# Patient Record
Sex: FEMALE | Race: WHITE | NOT HISPANIC OR LATINO | Employment: FULL TIME | URBAN - METROPOLITAN AREA
[De-identification: names, ages, dates, MRNs, and addresses within clinical notes are randomized per-mention and may not be internally consistent; named-entity substitution may affect disease eponyms.]

---

## 2018-09-20 ENCOUNTER — OFFICE VISIT CONVERTED (OUTPATIENT)
Dept: INTERNAL MEDICINE | Facility: CLINIC | Age: 38
End: 2018-09-20
Attending: INTERNAL MEDICINE

## 2018-11-16 ENCOUNTER — OFFICE VISIT CONVERTED (OUTPATIENT)
Dept: INTERNAL MEDICINE | Facility: CLINIC | Age: 38
End: 2018-11-16
Attending: INTERNAL MEDICINE

## 2019-01-31 ENCOUNTER — HOSPITAL ENCOUNTER (OUTPATIENT)
Dept: GENERAL RADIOLOGY | Facility: HOSPITAL | Age: 39
Discharge: HOME | End: 2019-01-31
Attending: INTERNAL MEDICINE

## 2019-02-21 ENCOUNTER — OFFICE VISIT CONVERTED (OUTPATIENT)
Dept: INTERNAL MEDICINE | Facility: CLINIC | Age: 39
End: 2019-02-21
Attending: INTERNAL MEDICINE

## 2019-02-21 ENCOUNTER — CONVERSION ENCOUNTER (OUTPATIENT)
Dept: INTERNAL MEDICINE | Facility: CLINIC | Age: 39
End: 2019-02-21

## 2020-10-15 ENCOUNTER — HOSPITAL ENCOUNTER (OUTPATIENT)
Dept: GENERAL RADIOLOGY | Facility: HOSPITAL | Age: 40
Discharge: HOME | End: 2020-10-15
Attending: OBSTETRICS & GYNECOLOGY

## 2020-12-04 ENCOUNTER — HOSPITAL ENCOUNTER (OUTPATIENT)
Dept: OTHER | Facility: HOSPITAL | Age: 40
Discharge: HOME | End: 2020-12-04
Attending: INTERNAL MEDICINE

## 2020-12-04 ENCOUNTER — OFFICE VISIT CONVERTED (OUTPATIENT)
Dept: INTERNAL MEDICINE | Facility: CLINIC | Age: 40
End: 2020-12-04
Attending: INTERNAL MEDICINE

## 2020-12-04 LAB
BASOPHILS # BLD AUTO: 0.05 10*3/UL (ref 0–0.2)
BASOPHILS NFR BLD AUTO: 0.5 % (ref 0–3)
CONV ABS IMM GRAN: 0.02 10*3/UL (ref 0–0.2)
CONV IMMATURE GRAN: 0.2 % (ref 0–1.8)
DEPRECATED RDW RBC AUTO: 39.2 FL (ref 36.4–46.3)
EOSINOPHIL # BLD AUTO: 0.23 10*3/UL (ref 0–0.7)
EOSINOPHIL # BLD AUTO: 2.4 % (ref 0–7)
ERYTHROCYTE [DISTWIDTH] IN BLOOD BY AUTOMATED COUNT: 12.1 % (ref 11.7–14.4)
HCT VFR BLD AUTO: 40.9 % (ref 37–47)
HGB BLD-MCNC: 13.4 G/DL (ref 12–16)
LYMPHOCYTES # BLD AUTO: 3.45 10*3/UL (ref 1–5)
LYMPHOCYTES NFR BLD AUTO: 36.2 % (ref 20–45)
MCH RBC QN AUTO: 28.8 PG (ref 27–31)
MCHC RBC AUTO-ENTMCNC: 32.8 G/DL (ref 33–37)
MCV RBC AUTO: 88 FL (ref 81–99)
MONOCYTES # BLD AUTO: 0.57 10*3/UL (ref 0.2–1.2)
MONOCYTES NFR BLD AUTO: 6 % (ref 3–10)
NEUTROPHILS # BLD AUTO: 5.21 10*3/UL (ref 2–8)
NEUTROPHILS NFR BLD AUTO: 54.7 % (ref 30–85)
NRBC CBCN: 0 % (ref 0–0.7)
PLATELET # BLD AUTO: 342 10*3/UL (ref 130–400)
PMV BLD AUTO: 9.4 FL (ref 9.4–12.3)
RBC # BLD AUTO: 4.65 10*6/UL (ref 4.2–5.4)
WBC # BLD AUTO: 9.53 10*3/UL (ref 4.8–10.8)

## 2020-12-05 LAB
ALBUMIN SERPL-MCNC: 4.4 G/DL (ref 3.5–5)
ALBUMIN/GLOB SERPL: 1.6 {RATIO} (ref 1.4–2.6)
ALP SERPL-CCNC: 55 U/L (ref 42–98)
ALT SERPL-CCNC: 16 U/L (ref 10–40)
ANION GAP SERPL CALC-SCNC: 17 MMOL/L (ref 8–19)
AST SERPL-CCNC: 22 U/L (ref 15–50)
BILIRUB SERPL-MCNC: 0.22 MG/DL (ref 0.2–1.3)
BUN SERPL-MCNC: 14 MG/DL (ref 5–25)
BUN/CREAT SERPL: 23 {RATIO} (ref 6–20)
CALCIUM SERPL-MCNC: 9.4 MG/DL (ref 8.7–10.4)
CHLORIDE SERPL-SCNC: 104 MMOL/L (ref 99–111)
CHOLEST SERPL-MCNC: 149 MG/DL (ref 107–200)
CHOLEST/HDLC SERPL: 1.7 {RATIO} (ref 3–6)
CONV CO2: 23 MMOL/L (ref 22–32)
CONV TOTAL PROTEIN: 7.1 G/DL (ref 6.3–8.2)
CREAT UR-MCNC: 0.6 MG/DL (ref 0.5–0.9)
GFR SERPLBLD BASED ON 1.73 SQ M-ARVRAT: >60 ML/MIN/{1.73_M2}
GLOBULIN UR ELPH-MCNC: 2.7 G/DL (ref 2–3.5)
GLUCOSE SERPL-MCNC: 98 MG/DL (ref 65–99)
HDLC SERPL-MCNC: 90 MG/DL (ref 40–60)
LDLC SERPL CALC-MCNC: 47 MG/DL (ref 70–100)
OSMOLALITY SERPL CALC.SUM OF ELEC: 290 MOSM/KG (ref 273–304)
POTASSIUM SERPL-SCNC: 4 MMOL/L (ref 3.5–5.3)
SODIUM SERPL-SCNC: 140 MMOL/L (ref 135–147)
TRIGL SERPL-MCNC: 58 MG/DL (ref 40–150)
TSH SERPL-ACNC: 3.37 M[IU]/L (ref 0.27–4.2)
VLDLC SERPL-MCNC: 12 MG/DL (ref 5–37)

## 2020-12-06 LAB — HBV SURFACE AB SER QL: REACTIVE

## 2020-12-24 ENCOUNTER — HOSPITAL ENCOUNTER (OUTPATIENT)
Dept: URGENT CARE | Facility: CLINIC | Age: 40
Discharge: HOME | End: 2020-12-24

## 2020-12-25 LAB — SARS-COV-2 RNA SPEC QL NAA+PROBE: NOT DETECTED

## 2021-05-13 NOTE — PROGRESS NOTES
"   Progress Note      Patient Name: Samantha Ferro   Patient ID: [id number]   Sex: Female   YOB: 1980    Primary Care Provider: Alison Manuel MD    Visit Date: December 4, 2020    Provider: Alison Manuel MD   Location: [facility]   Location Address: [address] [place] [address]  [id number]   Location Phone: [phone]          Chief Complaint  · Annual physical  · \"I am here for my routine follow up\"  · \"I need to have bloodwork done\"      History Of Present Illness  Samantha Ferro is a 40 year old /White female who presents for evaluation and treatment of:      chronic issues.    shoulder pain bilateral  waking up with some numbness and tingling    last mammo was normal    Dr. Castillo did last pap and it was normal    no family hx of ovarian cancer  no family hx of colon cancer         Past Medical History  Disease Name Date Onset Notes   Allergic rhinitis --  --    Arthritis --  --    Blood Diseases --  --    Broken Bones --  --    Finger numbness 03/15/2015 --    Obesity 03/15/2015 Continue exercise, healthy diet, check thyroid function panel   Sinus trouble --  --    Skin Disease --  --          Past Surgical History  Procedure Name Date Notes   Adenoidectomy 1989 --    Cesarian Section 2013 --    Femur Fracture 1984 --    Tonsilectomy 1989 --          Medication List  Name Date Started Instructions   cetirizine 10 mg oral tablet 02/21/2019 TAKE 1 TABLET BY MOUTH ONCE DAILY   fluticasone 50 mcg/actuation nasal spray,suspension 02/21/2019 spray 1 spray (50 mcg) in each nostril by intranasal route once daily   montelukast 10 mg oral tablet 10/26/2020 TAKE 1 TABLET BY MOUTH EVERY DAY IN THE EVENING   Xanax 0.5 mg oral tablet 11/06/2019 take 1 tablet (0.5 mg) by oral route 30 minutes prior to flight         Allergy List  Allergen Name Date Reaction Notes   Dander (animal) allergy --  --  --    Molds --  --  --    PENICILLINS --  Hives/Rash " "--    Ragweed --  --  --          Family Medical History  Disease Name Relative/Age Notes   Stroke  grandparents   Heart Disease Father/  Mother/   grandparents   Congestive Heart Failure Father/  Mother/   --    Lung cancer Father/  Uncle/   --    Prostate cancer Uncle/   --          Reproductive History  Menstrual   Last Menstrual Period: 2015   Pregnancy Summary   Total Pregnancies: 1 Full Term: 1 Premature: 0   Ab Induced: 0 Ab Spontaneous: 0 Ectopics: 0   Multiples: 0 Livin         Social History  Finding Status Start/Stop Quantity Notes   Alcohol Never --/-- --  --    Tobacco Never --/-- --  --          Immunizations  NameDate Admin Mfg Trade Name Lot Number Route Inj VIS Given VIS Publication   Wmfdkczty29/16/2018 SKB Fluzone Quadrivalent [id number] IM LD 2018   Comments: pt tolerated well, left office in stable condition         Review of Systems  · Constitutional  o Denies  o : fever, fatigue, weight loss, weight gain  · Cardiovascular  o Denies  o : lower extremity edema, claudication, chest pressure, palpitations  · Respiratory  o Denies  o : shortness of breath, wheezing, cough, hemoptysis, dyspnea on exertion  · Gastrointestinal  o Denies  o : nausea, vomiting, diarrhea, constipation, abdominal pain      Vitals  Date Time BP Position Site L\R Cuff Size HR RR TEMP (F) WT  HT  BMI kg/m2 BSA m2 O2 Sat FR L/min FiO2 HC       2018 03:33 /74 Sitting    73 - R 16 98.7 171lbs 2oz 5'  4\" 29.37 1.87 100 %      2019 09:04 /64 Sitting    78 - R 16 97.9 175lbs 8oz 5'  4\" 30.12 1.9 100 %      2020 03:27 /80 Sitting    73 - R 16 97.7 174lbs 8oz 5'  4\" 29.95 1.89 100 %  21%          Physical Examination  · Constitutional  o Appearance  o : no acute distress, well-nourished  · Head and Face  o Head  o :   § Inspection  § : atraumatic, normocephalic  · Eyes  o Eyes  o : extraocular movements intact, no scleral icterus, no conjunctival injection  · Ears, Nose, " Mouth and Throat  o Ears  o :   § External Ears  § : normal  o Nose  o :   § Intranasal Exam  § : nares patent  o Oral Cavity  o :   § Oral Mucosa  § : moist mucous membranes  · Respiratory  o Respiratory Effort  o : breathing comfortably, symmetric chest rise  o Auscultation of Lungs  o : clear to asculatation bilaterally, no wheezes, rales, or rhonchii  · Cardiovascular  o Heart  o :   § Auscultation of Heart  § : regular rate and rhythm, no murmurs, rubs, or gallops  o Peripheral Vascular System  o :   § Extremities  § : no edema  · Neurologic  o Mental Status Examination  o :   § Orientation  § : grossly oriented to person, place and time  o Gait and Station  o :   § Gait Screening  § : normal gait  · Psychiatric  o General  o : normal mood and affect          Assessment  · Annual physical exam     V70.0/Z00.00  very healthy  utd on vaccines  · Screening for depression     V79.0/Z13.89  · Screening for lipid disorders     V77.91/Z13.220  · Shoulder pain     719.41/M25.519  · Increased urinary frequency     788.41/R35.0       will check labs  can do imaging if shoulder pain worsens     Problems Reconciled  Plan  · Orders  o ACO-18: Negative screen for clinical depression using a standardized tool () - V79.0/Z13.89 - 12/04/2020  o CBC with Auto Diff [facility] (97706) - V70.0/Z00.00, V79.0/Z13.89, 719.41/M25.519 - 12/04/2020  o CMP [facility] (09776) - V70.0/Z00.00, V79.0/Z13.89, 719.41/M25.519 - 12/04/2020  o Lipid Panel [facility] (86308) - V77.91/Z13.220 - 12/04/2020  o TSH [facility] (01301) - V70.0/Z00.00, V79.0/Z13.89, 719.41/M25.519, V77.91/Z13.220 - 12/04/2020  o ACO-39: Current medications updated and reviewed (, 1159F) - - 12/04/2020  o Hepatitis B antibody titer (59714, 11355, 79576) - V70.0/Z00.00 - 12/04/2020  · Medications  o Medications have been Reconciled  o Transition of Care or Provider Policy  · Instructions  o Reviewed health maintenance flowsheet and updated information. Orders were placed and/or patient's  response was documented.  o Depression Screen completed and scanned into the EMR under the designated folder within the patient's documents.  o Today's PHQ-9 result is _0__  o Patient was educated/instructed on their diagnosis, treatment and medications prior to discharge from the clinic today.            Electronically Signed by: Alison Manuel MD -Author on December 27, 2020 03:05:48 PM

## 2021-05-14 VITALS
WEIGHT: 174.5 LBS | OXYGEN SATURATION: 100 % | DIASTOLIC BLOOD PRESSURE: 80 MMHG | HEIGHT: 64 IN | HEART RATE: 73 BPM | SYSTOLIC BLOOD PRESSURE: 116 MMHG | TEMPERATURE: 97.7 F | RESPIRATION RATE: 16 BRPM | BODY MASS INDEX: 29.79 KG/M2

## 2021-05-15 VITALS
HEIGHT: 64 IN | RESPIRATION RATE: 16 BRPM | WEIGHT: 175.5 LBS | OXYGEN SATURATION: 100 % | DIASTOLIC BLOOD PRESSURE: 64 MMHG | TEMPERATURE: 97.9 F | HEART RATE: 78 BPM | SYSTOLIC BLOOD PRESSURE: 108 MMHG | BODY MASS INDEX: 29.96 KG/M2

## 2021-05-16 VITALS
BODY MASS INDEX: 29.21 KG/M2 | RESPIRATION RATE: 16 BRPM | DIASTOLIC BLOOD PRESSURE: 74 MMHG | TEMPERATURE: 98.7 F | HEIGHT: 64 IN | OXYGEN SATURATION: 100 % | SYSTOLIC BLOOD PRESSURE: 118 MMHG | WEIGHT: 171.12 LBS | HEART RATE: 73 BPM

## 2021-05-16 VITALS
BODY MASS INDEX: 28.25 KG/M2 | DIASTOLIC BLOOD PRESSURE: 52 MMHG | OXYGEN SATURATION: 100 % | RESPIRATION RATE: 14 BRPM | HEIGHT: 64 IN | HEART RATE: 67 BPM | WEIGHT: 165.5 LBS | SYSTOLIC BLOOD PRESSURE: 102 MMHG | TEMPERATURE: 98.2 F

## 2021-07-22 RX ORDER — MONTELUKAST SODIUM 10 MG/1
10 TABLET ORAL EVERY EVENING
Qty: 90 TABLET | Refills: 1 | Status: SHIPPED | OUTPATIENT
Start: 2021-07-22 | End: 2021-11-29 | Stop reason: SDUPTHER

## 2021-11-29 RX ORDER — MONTELUKAST SODIUM 10 MG/1
10 TABLET ORAL EVERY EVENING
Qty: 90 TABLET | Refills: 1 | Status: SHIPPED | OUTPATIENT
Start: 2021-11-29 | End: 2022-05-19 | Stop reason: SDUPTHER

## 2021-11-29 NOTE — TELEPHONE ENCOUNTER
Caller: Samantha Ferro    Relationship: Self    Best call back number: [phone]    Requested Prescriptions:   Requested Prescriptions     Pending Prescriptions Disp Refills   • montelukast (SINGULAIR) 10 MG tablet 90 tablet 1     Sig: Take 1 tablet by mouth Every Evening.        Pharmacy where request should be sent: [id number] - [place], [place] - [address] AT [facility] [phone] [facility] [phone]      Additional details provided by patient:     Does the patient have less than a 3 day supply:  [x] Yes  [] No    Prabhakar Tsai Rep   11/29/21 13:44 EST

## 2022-05-19 ENCOUNTER — OFFICE VISIT (OUTPATIENT)
Dept: INTERNAL MEDICINE | Facility: CLINIC | Age: 42
End: 2022-05-19

## 2022-05-19 VITALS
HEART RATE: 68 BPM | DIASTOLIC BLOOD PRESSURE: 72 MMHG | BODY MASS INDEX: 31.41 KG/M2 | HEIGHT: 64 IN | WEIGHT: 184 LBS | TEMPERATURE: 97.4 F | RESPIRATION RATE: 18 BRPM | OXYGEN SATURATION: 100 % | SYSTOLIC BLOOD PRESSURE: 124 MMHG

## 2022-05-19 DIAGNOSIS — M79.621 PAIN IN RIGHT UPPER ARM: Primary | ICD-10-CM

## 2022-05-19 PROCEDURE — 99213 OFFICE O/P EST LOW 20 MIN: CPT | Performed by: NURSE PRACTITIONER

## 2022-05-19 RX ORDER — FLUTICASONE PROPIONATE 50 MCG
1 SPRAY, SUSPENSION (ML) NASAL ONCE AS NEEDED
COMMUNITY

## 2022-05-19 RX ORDER — CETIRIZINE HYDROCHLORIDE 5 MG/1
5 TABLET ORAL DAILY
Qty: 90 TABLET | Refills: 1 | Status: CANCELLED | OUTPATIENT
Start: 2022-05-19

## 2022-05-19 RX ORDER — IBUPROFEN 600 MG/1
600 TABLET ORAL EVERY 8 HOURS PRN
Qty: 90 TABLET | Refills: 1 | Status: SHIPPED | OUTPATIENT
Start: 2022-05-19 | End: 2022-10-21

## 2022-05-19 RX ORDER — MONTELUKAST SODIUM 10 MG/1
10 TABLET ORAL EVERY EVENING
Qty: 90 TABLET | Refills: 1 | Status: SHIPPED | OUTPATIENT
Start: 2022-05-19 | End: 2022-11-14

## 2022-05-19 RX ORDER — CETIRIZINE HYDROCHLORIDE 5 MG/1
5 TABLET ORAL DAILY
COMMUNITY

## 2022-05-19 NOTE — PROGRESS NOTES
"Chief Complaint  Arm Pain (Right Arm- 3 Months)    Subjective          Samantha Ferro presents to [facility]  History of Present Illness  Right arm pain at night in the last 3 mth  She has had issues with numbness and tingling ffor years  She has started exercising prior to onset of pain but denies known injury  She reports pain with lifting arm and with internal rotation  Denies related or recent paresthesias of the hand    Objective   Vital Signs:   /72 (BP Location: Left arm, Patient Position: Sitting, Cuff Size: Adult)   Pulse 68   Temp 97.4 °F (36.3 °C)   Resp 18   Ht 162.6 cm (64\")   Wt 83.5 kg (184 lb)   SpO2 100%   BMI 31.58 kg/m²     Physical Exam  Vitals and nursing note reviewed.   Constitutional:       General: She is not in acute distress.     Appearance: Normal appearance.   HENT:      Head: Normocephalic and atraumatic.      Right Ear: External ear normal.      Left Ear: External ear normal.      Nose: Nose normal.      Mouth/Throat:      Mouth: Mucous membranes are moist.   Eyes:      Conjunctiva/sclera: Conjunctivae normal.   Cardiovascular:      Rate and Rhythm: Normal rate and regular rhythm.      Pulses: Normal pulses.      Heart sounds: Normal heart sounds. No murmur heard.    No friction rub. No gallop.   Pulmonary:      Effort: Pulmonary effort is normal. No respiratory distress.      Breath sounds: No wheezing, rhonchi or rales.   Musculoskeletal:      Right upper arm: Normal. No swelling, edema, deformity, lacerations, tenderness or bony tenderness.      Cervical back: Neck supple.   Skin:     General: Skin is warm and dry.   Neurological:      General: No focal deficit present.      Mental Status: She is alert and oriented to person, place, and time.   Psychiatric:         Mood and Affect: Mood normal.         Behavior: Behavior normal.        Result Review :          Procedures      Assessment and Plan    Diagnoses and " all orders for this visit:    1. Pain in right upper arm (Primary)  Comments:  discussed likely strain/sprain injury. will try nsaids x2 wks and start rehab exercises at home. discussed outpt PT if not improving in 4-6 wks    Other orders  -     montelukast (SINGULAIR) 10 MG tablet; Take 1 tablet by mouth Every Evening.  Dispense: 90 tablet; Refill: 1  -     ibuprofen (ADVIL,MOTRIN) 600 MG tablet; Take 1 tablet by mouth Every 8 (Eight) Hours As Needed for Mild Pain .  Dispense: 90 tablet; Refill: 1              Follow Up   Return if symptoms worsen or fail to improve.  Patient was given instructions and counseling regarding her condition or for health maintenance advice. Please see specific information pulled into the AVS if appropriate.

## 2022-09-13 ENCOUNTER — TELEPHONE (OUTPATIENT)
Dept: INTERNAL MEDICINE | Facility: CLINIC | Age: 42
End: 2022-09-13

## 2022-09-13 DIAGNOSIS — M79.601 PAIN OF RIGHT UPPER EXTREMITY: Primary | ICD-10-CM

## 2022-09-13 NOTE — TELEPHONE ENCOUNTER
Caller: Samantha Ferro    Relationship: Self    Best call back number: [phone]    Who are you requesting to speak with (clinical staff, provider,  specific staff member): MEDICAL STAFF    What was the call regarding: PATIENT WAS SEEN IN MAY FOR ARM PAIN. SHE WAS TOLD TO TAKE IBUPROFEN FOR 4 WEEKS. SHE TOOK THE MEDICATION AND HER ARM IS STILL BOTHERING HER. SHE FEELS LIKE HER ARM IS GETTING WORSE AND THAT THERE IS SOMETHING WRONG. SHE WOULD LIKE TO KNOW WHAT THE NEXT STEP IS. PLEASE CALL PATIENT TO ADVISE.

## 2022-09-19 NOTE — TELEPHONE ENCOUNTER
Patient saw Keyla on 5/19/22 for her arm pain. Per Keyla`s note: discussed likely strain/sprain injury. will try nsaids x2 wks and start rehab exercises at home. discussed outpt PT if not improving in 4-6 wks.     Patient stated that she is still having the arm pain requesting what to do next.

## 2022-09-21 NOTE — TELEPHONE ENCOUNTER
It looks like she has not had an x-ray.  If not I would recommend starting there if she has and everything was normal then I think physical therapy would be the next step.

## 2022-09-22 NOTE — TELEPHONE ENCOUNTER
Red rule verified and correct.    Pt ok with x-ray; advised she believes it is more soft tissue.  Pain localizes between her elbow and shoulder, right.    It has hurt since her covid vaccine injection.

## 2022-09-22 NOTE — TELEPHONE ENCOUNTER
Xray order placed, and she can get any time, but if pain significant or if she has fever etc she needs to be seen

## 2022-09-22 NOTE — TELEPHONE ENCOUNTER
Red rule verified and correct.    Relayed message from Dr Alison Manuel.     Patient verbalized understanding.

## 2022-09-22 NOTE — TELEPHONE ENCOUNTER
Caller: Samantha Ferro    Relationship: Self    Best call back number: [phone]    What is the best time to reach you: ANY    Who are you requesting to speak with (clinical staff, provider,  specific staff member): CLINICAL    What was the call regarding: NEXT STEP FOR ARM PAIN    Do you require a callback: PATIENT REQUESTS A CALL BACK ASAP TO DISCUSS.

## 2022-09-23 ENCOUNTER — HOSPITAL ENCOUNTER (OUTPATIENT)
Dept: GENERAL RADIOLOGY | Facility: HOSPITAL | Age: 42
Discharge: HOME | End: 2022-09-23
Admitting: INTERNAL MEDICINE

## 2022-09-23 DIAGNOSIS — M79.601 PAIN OF RIGHT UPPER EXTREMITY: ICD-10-CM

## 2022-09-23 PROCEDURE — 73060 X-RAY EXAM OF HUMERUS: CPT

## 2022-09-26 ENCOUNTER — TELEPHONE (OUTPATIENT)
Dept: INTERNAL MEDICINE | Facility: CLINIC | Age: 42
End: 2022-09-26

## 2022-10-21 ENCOUNTER — OFFICE VISIT (OUTPATIENT)
Dept: INTERNAL MEDICINE | Facility: CLINIC | Age: 42
End: 2022-10-21

## 2022-10-21 VITALS
TEMPERATURE: 98.3 F | BODY MASS INDEX: 32.27 KG/M2 | HEIGHT: 64 IN | SYSTOLIC BLOOD PRESSURE: 120 MMHG | HEART RATE: 87 BPM | RESPIRATION RATE: 18 BRPM | DIASTOLIC BLOOD PRESSURE: 78 MMHG | WEIGHT: 189 LBS | OXYGEN SATURATION: 97 %

## 2022-10-21 DIAGNOSIS — G89.29 CHRONIC RIGHT SHOULDER PAIN: ICD-10-CM

## 2022-10-21 DIAGNOSIS — M79.601 PAIN OF RIGHT UPPER EXTREMITY: ICD-10-CM

## 2022-10-21 DIAGNOSIS — Z00.00 ANNUAL PHYSICAL EXAM: Primary | ICD-10-CM

## 2022-10-21 DIAGNOSIS — M25.511 CHRONIC RIGHT SHOULDER PAIN: ICD-10-CM

## 2022-10-21 PROCEDURE — 99396 PREV VISIT EST AGE 40-64: CPT | Performed by: INTERNAL MEDICINE

## 2022-10-21 NOTE — PROGRESS NOTES
"Chief Complaint  Follow-up, Arm Pain (Right Arm- Since April), and Annual Exam    Subjective          Samantha Ferro presents to [facility]  History of Present Illness     The patient is a 42-year-old female who presents today for evaluation of right upper extremity pain.    Right upper extremity pain  The patient reports pain localized to the lateral aspect of right upper extremity. She states her pain began in approximately 01/2022 or 02/2022. At that time, she was exercising and believed she may have lifted a dumbbell \"weird\" as she was not performing any strenuous activity. In 04/2022, she saw Keyla where x-rays of the right upper extremity were taken and ibuprofen was prescribed. The patient states the ibuprofen reduced her pain, but did not fully resolve it. 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 Currently, she reports having difficulty lifting her upper extremity fully to insert a wire at work as well as pain when pushing the wire. She primarily has pain when reaching behind or across her body. The patient denies any tenderness to palpation. She does report tightness. She notes a history of paresthesias in her hands. When lying on her right upper extremity at night, she notices pain and she has to physically lift her upper extremity to roll over. The patient is scheduled to see her chiropractor on 10/27/2022. She is concerned about nerve damage secondary to her COVID-19 vaccination, although she obtained this a long time ago. She notes she is right-hand dominant and is usually stronger on her right side, although currently her l eft side is stronger. Her pain is increased when working. The patient denies any dyspnea, angina, or headaches. Although, she does note seeing her eye doctor today for a new prescription. However, she denies any blurry spots or loss of vision.     The patient states she " "recently obtained laboratory studies ordered by CLAUS Chavez from Doctor Lashonda's office in [facility]. He stated her blood work was all within normal limits.      The patient works part-time as an orthodontic assistant.     Foot pain  The patient states she saw her foot specialist today where she was prescribed anti-inflammatory medication and given a cortisone injection in her foot. She inquires if this will help her upper extremity pain.    Health maintenance  The patient states she recently called and scheduled her Pap smear for 11/2022. Her last Pap smear was approximately 2 years ago. Her blood pressure is excellent today.     Family history  The patient states her mother has fibromyalgia. She denies any family history of breast or ovarian cancer.     Objective   Vital Signs:   /78 (BP Location: Right arm, Patient Position: Sitting, Cuff Size: Adult)   Pulse 87   Temp 98.3 °F (36.8 °C)   Resp 18   Ht 162.6 cm (64\")   Wt 85.7 kg (189 lb)   SpO2 97%   BMI 32.44 kg/m²     Physical Exam  Vitals reviewed.   Constitutional:       Appearance: Normal appearance. She is well-developed.   HENT:      Head: Normocephalic and atraumatic.      Right Ear: External ear normal.      Left Ear: External ear normal.   Eyes:      Conjunctiva/sclera: Conjunctivae normal.      Pupils: Pupils are equal, round, and reactive to light.   Cardiovascular:      Rate and Rhythm: Normal rate and regular rhythm.      Heart sounds: No murmur heard.    No friction rub. No gallop.   Pulmonary:      Effort: Pulmonary effort is normal.      Breath sounds: Normal breath sounds. No wheezing or rhonchi.   Skin:     General: Skin is warm and dry.   Neurological:      Mental Status: She is alert and oriented to person, place, and time.   Psychiatric:         Mood and Affect: Affect normal.         Behavior: Behavior normal.         Thought Content: Thought content normal.        Result Review :           No results found for this or " any previous visit.         Procedures        Assessment and Plan    Diagnoses and all orders for this visit:    1. Annual physical exam (Primary)  Comments:  She is scheduled for her Pap smear. Offered, but declines an influenza vaccination. Advised no exercise at this time. Continue healthy diet.    2. Pain of right upper extremity  -     MRI Shoulder Right Without Contrast; Future    3. Chronic right shoulder pain  Comments:  MRI scan ordered. Treatment will be determined based on the results. Recent laboratory study records requested.  Orders:  -     MRI Shoulder Right Without Contrast; Future        {BMI is >= 30 and <35. (Class 1 Obesity). The following options were offered after discussion;: nutrition counseling/recommendations            Follow Up   Return in about 2 months (around 12/21/2022).  Patient was given instructions and counseling regarding her condition or for health maintenance advice. Please see specific information pulled into the AVS if appropriate.     Transcribed from ambient dictation for Alison Manuel MD by Haleigh Trevino.  10/21/22   19:41 EDT    Patient or patient representative verbalized consent to the visit recording.  I have personally performed the services described in this document as transcribed by the above individual, and it is both accurate and complete.

## 2022-11-04 ENCOUNTER — TELEPHONE (OUTPATIENT)
Dept: INTERNAL MEDICINE | Facility: CLINIC | Age: 42
End: 2022-11-04

## 2022-11-04 DIAGNOSIS — M79.603 PAIN OF UPPER EXTREMITY, UNSPECIFIED LATERALITY: Primary | ICD-10-CM

## 2022-11-04 NOTE — TELEPHONE ENCOUNTER
Caller: Samantha Ferro    Relationship: Self    Best call back number: [phone]    What is the best time to reach you: ANYTIME    Who are you requesting to speak with (clinical staff, provider,  specific staff member): CLINICAL    What was the call regarding: PATIENT IS CALLING TO LET US KNOW THAT THE MRI THAT [facility] WANTED PATIENT TO DO IS GOING TO COST HER OVER $1,000. SHE MENTIONED THAT SHE DID SEE A MASSAGE THERAPIST REGARDING HER SHOULDER AND THEY MENTIONED THAT PHYSICAL THERAPY COULD HELP. PATIENT IS UNSURE IF PHYSICAL THERAPY WILL BE COVERED IF SHE DOESN'T HAVE A MRI FIRST, OR NOT. SHE IS REQUESTING CALL BACK IN REGARDS TO THIS AND FIND OUT WHAT THE NEXT STEP IS.     Do you require a callback: YES

## 2022-11-14 RX ORDER — MONTELUKAST SODIUM 10 MG/1
10 TABLET ORAL EVERY EVENING
Qty: 90 TABLET | Refills: 1 | Status: SHIPPED | OUTPATIENT
Start: 2022-11-14

## 2022-11-16 NOTE — PROGRESS NOTES
Well Woman Visit    CC: WWE     Myriad intake: No    Tobacco/Nicotine use:  No    HPI:   42 y.o. Contraception or HRT: Contraception:  Tubal ligation  NIKITA-3 2013 with LEEP    History: PMHx, Meds, Allergies, PSHx, Social Hx, and POBHx all reviewed and updated.  PCP: does manage PMHx and preventative labs      /76   Wt 84.8 kg (187 lb)   LMP 10/27/2022 (Approximate)   BMI 32.10 kg/m²     Physical Exam Physical Exam  Vitals and nursing note reviewed. Exam conducted with a chaperone present.   Constitutional:       Appearance: Normal appearance.   Neck:      Thyroid: No thyroid mass or thyromegaly.   Cardiovascular:      Rate and Rhythm: Regular rhythm.   Pulmonary:      Effort: Pulmonary effort is normal.      Unlabored  Chest:      Breasts: Normal glandularity        right: No mass, nipple discharge or tenderness.         Left: No mass, nipple discharge or tenderness.   Abdominal:      General: There is no distension.      Palpations: Abdomen is soft.      Tenderness: There is no guarding or rebound.   Genitourinary:     General: Normal vulva.      Labia:   No lesions     Urethra: No urethral pain or urethral lesion.      Vagina: Normal.      Cervix: Normal.      Uterus: Normal.  Mid     Adnexa: Right adnexa normal and left adnexa normal.   Musculoskeletal:      Cervical back: Neck supple. No tenderness.   Skin:     General: Skin is warm and dry.   Neurological:      Mental Status: She is alert and oriented to person, place, and time.   Psychiatric:         Mood and Affect: Mood normal.         Behavior: Behavior normal.         Thought Content: Thought content normal.       ASSESSMENT AND PLAN:  WWE    Diagnoses and all orders for this visit:    1. Well woman exam with routine gynecological exam (Primary)  -     IgP, Aptima HPV  -     Mammo Screening Digital Tomosynthesis Bilateral With CAD          Counseling:     Mammogram recommended yearly    Preventative:   MMG ordered mammogram  ordered      Follow Up:  No follow-ups on file.    David Castillo Sr., MD  11/17/2022

## 2022-11-17 ENCOUNTER — OFFICE VISIT (OUTPATIENT)
Dept: OBSTETRICS AND GYNECOLOGY | Facility: CLINIC | Age: 42
End: 2022-11-17

## 2022-11-17 VITALS — BODY MASS INDEX: 32.1 KG/M2 | SYSTOLIC BLOOD PRESSURE: 122 MMHG | DIASTOLIC BLOOD PRESSURE: 76 MMHG | WEIGHT: 187 LBS

## 2022-11-17 DIAGNOSIS — Z01.419 WELL WOMAN EXAM WITH ROUTINE GYNECOLOGICAL EXAM: Primary | ICD-10-CM

## 2022-11-17 PROCEDURE — 99396 PREV VISIT EST AGE 40-64: CPT | Performed by: OBSTETRICS & GYNECOLOGY

## 2022-11-17 PROCEDURE — 87624 HPV HI-RISK TYP POOLED RSLT: CPT | Performed by: OBSTETRICS & GYNECOLOGY

## 2022-11-17 PROCEDURE — G0123 SCREEN CERV/VAG THIN LAYER: HCPCS | Performed by: OBSTETRICS & GYNECOLOGY

## 2022-11-17 RX ORDER — MELOXICAM 15 MG/1
15 TABLET ORAL DAILY
COMMUNITY
Start: 2022-10-21

## 2022-11-17 RX ORDER — PHENTERMINE HYDROCHLORIDE 37.5 MG/1
TABLET ORAL
COMMUNITY
Start: 2022-11-04

## 2022-11-17 RX ORDER — AZITHROMYCIN 250 MG/1
TABLET, FILM COATED ORAL
Qty: 6 TABLET | Refills: 1 | Status: SHIPPED | OUTPATIENT
Start: 2022-11-17 | End: 2022-11-22

## 2022-11-18 ENCOUNTER — APPOINTMENT (OUTPATIENT)
Dept: MRI IMAGING | Facility: HOSPITAL | Age: 42
End: 2022-11-18

## 2022-11-23 NOTE — TELEPHONE ENCOUNTER
Very reasonable to get physical therapy first.  I do not know what will cost every insurance is different especially if she has a high deductible plan is likely to cost quite a bit.  Place order if she would like to go diagnosis arm pain.

## 2022-11-28 LAB
CYTOLOGIST CVX/VAG CYTO: NORMAL
CYTOLOGY CVX/VAG DOC CYTO: NORMAL
CYTOLOGY CVX/VAG DOC THIN PREP: NORMAL
DX ICD CODE: NORMAL
HIV 1 & 2 AB SER-IMP: NORMAL
HPV I/H RISK 4 DNA CVX QL PROBE+SIG AMP: NEGATIVE
OTHER STN SPEC: NORMAL
STAT OF ADQ CVX/VAG CYTO-IMP: NORMAL

## 2022-12-19 ENCOUNTER — TELEPHONE (OUTPATIENT)
Dept: PHYSICAL THERAPY | Facility: CLINIC | Age: 42
End: 2022-12-19

## 2023-02-03 ENCOUNTER — TREATMENT (OUTPATIENT)
Dept: PHYSICAL THERAPY | Facility: CLINIC | Age: 43
End: 2023-02-03
Payer: COMMERCIAL

## 2023-02-03 DIAGNOSIS — M75.41 IMPINGEMENT SYNDROME OF RIGHT SHOULDER: ICD-10-CM

## 2023-02-03 DIAGNOSIS — M25.511 CHRONIC RIGHT SHOULDER PAIN: Primary | ICD-10-CM

## 2023-02-03 DIAGNOSIS — G89.29 CHRONIC RIGHT SHOULDER PAIN: Primary | ICD-10-CM

## 2023-02-03 DIAGNOSIS — R29.898 WEAKNESS OF RIGHT UPPER EXTREMITY: ICD-10-CM

## 2023-02-03 PROCEDURE — 97161 PT EVAL LOW COMPLEX 20 MIN: CPT | Performed by: PHYSICAL THERAPIST

## 2023-02-03 PROCEDURE — 97110 THERAPEUTIC EXERCISES: CPT | Performed by: PHYSICAL THERAPIST

## 2023-02-10 ENCOUNTER — TREATMENT (OUTPATIENT)
Dept: PHYSICAL THERAPY | Facility: CLINIC | Age: 43
End: 2023-02-10
Payer: COMMERCIAL

## 2023-02-10 DIAGNOSIS — M25.511 CHRONIC RIGHT SHOULDER PAIN: Primary | ICD-10-CM

## 2023-02-10 DIAGNOSIS — M75.41 IMPINGEMENT SYNDROME OF RIGHT SHOULDER: ICD-10-CM

## 2023-02-10 DIAGNOSIS — R29.898 WEAKNESS OF RIGHT UPPER EXTREMITY: ICD-10-CM

## 2023-02-10 DIAGNOSIS — G89.29 CHRONIC RIGHT SHOULDER PAIN: Primary | ICD-10-CM

## 2023-02-10 PROCEDURE — 97140 MANUAL THERAPY 1/> REGIONS: CPT | Performed by: PHYSICAL THERAPIST

## 2023-02-10 PROCEDURE — 97112 NEUROMUSCULAR REEDUCATION: CPT | Performed by: PHYSICAL THERAPIST

## 2023-02-10 PROCEDURE — 97110 THERAPEUTIC EXERCISES: CPT | Performed by: PHYSICAL THERAPIST

## 2023-02-10 NOTE — PROGRESS NOTES
Outpatient Physical Therapy  [address] [facility] [place] [place] [address]                            Physical Therapy Daily Treatment Note    Patient: Samantha Ferro   : 1980  Diagnosis/ICD-10 Code:  Chronic right shoulder pain [M25.511, G89.29]  Referring practitioner: Alison Manuel MD  Date of Initial Visit: Type: THERAPY  Noted: 2/3/2023  Today's Date: 2/10/2023  Patient seen for 2 sessions             Subjective   Samantha Ferro reports: that shoulder is doing a little better, she has been doing her exercises and feels like she ends up with a burning pain in her neck/top of shoulder with exercises.     Objective   Decreased Range of Motion  And increased discomfort with PROM shoulder abduction.    See Exercise, Manual, and Modality Logs for complete treatment.     Assessment/Plan  Samantha progressing as evident by decreased overall shoulder pain. Pt tolerated exercises and manual well, decreased ROM and minimal discomfort with shoulder abduction. Pt would benefit from skilled PT to address Range of Motion  and Strength deficits, pain management and any concerns with ADLs.         Progress per Plan of Care         Timed:  Manual Therapy:    14     mins  03709;  Therapeutic Exercise:    8     mins  61335;     Neuromuscular Darron:   8     mins  59591;    Therapeutic Activity:          mins  98465;     Gait Training:           mins  85736;    Aquatic Therapy:          mins  16381;       Untimed:  Electrical Stimulation:         mins  94667 ( );  Mechanical Traction:         mins  67452;       Timed Treatment:   30   mins   Total Treatment:     30   mins      Electronically signed:   Bess Benavidez PTA  Physical Therapist Assistant  [facility] License #: [id number]

## 2023-04-06 ENCOUNTER — HOSPITAL ENCOUNTER (OUTPATIENT)
Dept: MAMMOGRAPHY | Facility: HOSPITAL | Age: 43
Discharge: HOME | End: 2023-04-06
Admitting: OBSTETRICS & GYNECOLOGY
Payer: COMMERCIAL

## 2023-04-06 PROCEDURE — 77067 SCR MAMMO BI INCL CAD: CPT

## 2023-04-06 PROCEDURE — 77063 BREAST TOMOSYNTHESIS BI: CPT

## 2023-04-17 RX ORDER — MONTELUKAST SODIUM 10 MG/1
10 TABLET ORAL EVERY EVENING
Qty: 90 TABLET | Refills: 1 | Status: SHIPPED | OUTPATIENT
Start: 2023-04-17

## 2023-07-20 ENCOUNTER — TELEPHONE (OUTPATIENT)
Dept: INTERNAL MEDICINE | Facility: CLINIC | Age: 43
End: 2023-07-20
Payer: COMMERCIAL

## 2023-07-20 DIAGNOSIS — Z13.220 SCREENING, LIPID: ICD-10-CM

## 2023-07-20 DIAGNOSIS — R53.83 OTHER FATIGUE: Primary | ICD-10-CM

## 2023-07-20 DIAGNOSIS — L65.9 HAIR LOSS: ICD-10-CM

## 2023-07-20 NOTE — TELEPHONE ENCOUNTER
"    Caller: Samantha Ferro \"Sadie\"    Relationship: Self    Best call back number: [phone]    What orders are you requesting (i.e. lab or imaging): BLOOD WORK ORDERS, CHECK THYROID    In what timeframe would the patient need to come in: AS SOON AS POSSIBLE        Additional notes: PATIENT STATED HER HAIR ISNT GROWING AND IT'S THINNING. SHE WOULD LIKE TO HAVE HER THYROID CHECKED. PATIENT WOULD ALSO LIKE TO DISCUSS NAUSEA MEDICATION BECAUSE SHE IS GOING TO GO ON A CRUISE AT THE END OF AUGUST.     "

## 2023-07-27 ENCOUNTER — LAB (OUTPATIENT)
Dept: LAB | Facility: HOSPITAL | Age: 43
End: 2023-07-27
Payer: COMMERCIAL

## 2023-07-27 LAB
ALBUMIN SERPL-MCNC: 4.1 G/DL (ref 3.5–5.2)
ALBUMIN/GLOB SERPL: 1.6 G/DL
ALP SERPL-CCNC: 61 U/L (ref 39–117)
ALT SERPL W P-5'-P-CCNC: 8 U/L (ref 1–33)
ANION GAP SERPL CALCULATED.3IONS-SCNC: 11.5 MMOL/L (ref 5–15)
AST SERPL-CCNC: 14 U/L (ref 1–32)
BASOPHILS # BLD AUTO: 0.06 10*3/MM3 (ref 0–0.2)
BASOPHILS NFR BLD AUTO: 0.6 % (ref 0–1.5)
BILIRUB SERPL-MCNC: 0.4 MG/DL (ref 0–1.2)
BUN SERPL-MCNC: 11 MG/DL (ref 6–20)
BUN/CREAT SERPL: 13.8 (ref 7–25)
CALCIUM SPEC-SCNC: 8.9 MG/DL (ref 8.6–10.5)
CHLORIDE SERPL-SCNC: 106 MMOL/L (ref 98–107)
CHOLEST SERPL-MCNC: 147 MG/DL (ref 0–200)
CO2 SERPL-SCNC: 24.5 MMOL/L (ref 22–29)
CREAT SERPL-MCNC: 0.8 MG/DL (ref 0.57–1)
DEPRECATED RDW RBC AUTO: 40.1 FL (ref 37–54)
EGFRCR SERPLBLD CKD-EPI 2021: 92.7 ML/MIN/1.73
EOSINOPHIL # BLD AUTO: 0.12 10*3/MM3 (ref 0–0.4)
EOSINOPHIL NFR BLD AUTO: 1.1 % (ref 0.3–6.2)
ERYTHROCYTE [DISTWIDTH] IN BLOOD BY AUTOMATED COUNT: 13.1 % (ref 12.3–15.4)
GLOBULIN UR ELPH-MCNC: 2.6 GM/DL
GLUCOSE SERPL-MCNC: 76 MG/DL (ref 65–99)
HCT VFR BLD AUTO: 39.6 % (ref 34–46.6)
HDLC SERPL-MCNC: 87 MG/DL (ref 40–60)
HGB BLD-MCNC: 13.5 G/DL (ref 12–15.9)
IMM GRANULOCYTES # BLD AUTO: 0.03 10*3/MM3 (ref 0–0.05)
IMM GRANULOCYTES NFR BLD AUTO: 0.3 % (ref 0–0.5)
LDLC SERPL CALC-MCNC: 51 MG/DL (ref 0–100)
LDLC/HDLC SERPL: 0.61 {RATIO}
LYMPHOCYTES # BLD AUTO: 1.87 10*3/MM3 (ref 0.7–3.1)
LYMPHOCYTES NFR BLD AUTO: 17.8 % (ref 19.6–45.3)
MCH RBC QN AUTO: 28.8 PG (ref 26.6–33)
MCHC RBC AUTO-ENTMCNC: 34.1 G/DL (ref 31.5–35.7)
MCV RBC AUTO: 84.4 FL (ref 79–97)
MONOCYTES # BLD AUTO: 0.71 10*3/MM3 (ref 0.1–0.9)
MONOCYTES NFR BLD AUTO: 6.8 % (ref 5–12)
NEUTROPHILS NFR BLD AUTO: 7.71 10*3/MM3 (ref 1.7–7)
NEUTROPHILS NFR BLD AUTO: 73.4 % (ref 42.7–76)
NRBC BLD AUTO-RTO: 0 /100 WBC (ref 0–0.2)
PLATELET # BLD AUTO: 355 10*3/MM3 (ref 140–450)
PMV BLD AUTO: 8.9 FL (ref 6–12)
POTASSIUM SERPL-SCNC: 4.3 MMOL/L (ref 3.5–5.2)
PROT SERPL-MCNC: 6.7 G/DL (ref 6–8.5)
RBC # BLD AUTO: 4.69 10*6/MM3 (ref 3.77–5.28)
SODIUM SERPL-SCNC: 142 MMOL/L (ref 136–145)
T4 FREE SERPL-MCNC: 1.51 NG/DL (ref 0.93–1.7)
TRIGL SERPL-MCNC: 33 MG/DL (ref 0–150)
TSH SERPL DL<=0.05 MIU/L-ACNC: 2.02 UIU/ML (ref 0.27–4.2)
VLDLC SERPL-MCNC: 9 MG/DL (ref 5–40)
WBC NRBC COR # BLD: 10.5 10*3/MM3 (ref 3.4–10.8)

## 2023-07-27 PROCEDURE — 80061 LIPID PANEL: CPT | Performed by: INTERNAL MEDICINE

## 2023-07-27 PROCEDURE — 84439 ASSAY OF FREE THYROXINE: CPT | Performed by: INTERNAL MEDICINE

## 2023-07-27 PROCEDURE — 80050 GENERAL HEALTH PANEL: CPT | Performed by: INTERNAL MEDICINE

## 2023-07-30 RX ORDER — SCOLOPAMINE TRANSDERMAL SYSTEM 1 MG/1
1 PATCH, EXTENDED RELEASE TRANSDERMAL
Qty: 10 PATCH | Refills: 1 | Status: SHIPPED | OUTPATIENT
Start: 2023-07-30

## 2023-08-14 ENCOUNTER — TELEPHONE (OUTPATIENT)
Dept: INTERNAL MEDICINE | Facility: CLINIC | Age: 43
End: 2023-08-14
Payer: COMMERCIAL

## 2023-08-15 RX ORDER — DIMENHYDRINATE 50 MG
50 TABLET ORAL NIGHTLY PRN
Qty: 30 TABLET | Refills: 1 | Status: SHIPPED | OUTPATIENT
Start: 2023-08-15

## 2023-09-21 ENCOUNTER — TELEPHONE (OUTPATIENT)
Dept: OBSTETRICS AND GYNECOLOGY | Facility: CLINIC | Age: 43
End: 2023-09-21
Payer: COMMERCIAL

## 2023-10-09 RX ORDER — MONTELUKAST SODIUM 10 MG/1
10 TABLET ORAL EVERY EVENING
Qty: 90 TABLET | Refills: 1 | Status: SHIPPED | OUTPATIENT
Start: 2023-10-09

## 2023-12-19 ENCOUNTER — OFFICE VISIT (OUTPATIENT)
Dept: OBSTETRICS AND GYNECOLOGY | Facility: CLINIC | Age: 43
End: 2023-12-19
Payer: COMMERCIAL

## 2023-12-19 VITALS
DIASTOLIC BLOOD PRESSURE: 92 MMHG | HEART RATE: 88 BPM | BODY MASS INDEX: 32.1 KG/M2 | HEIGHT: 64 IN | WEIGHT: 188 LBS | SYSTOLIC BLOOD PRESSURE: 128 MMHG

## 2023-12-19 DIAGNOSIS — Z01.419 WELL WOMAN EXAM WITH ROUTINE GYNECOLOGICAL EXAM: Primary | ICD-10-CM

## 2023-12-19 LAB — FSH SERPL-ACNC: 9.53 MIU/ML

## 2023-12-19 PROCEDURE — 83001 ASSAY OF GONADOTROPIN (FSH): CPT | Performed by: OBSTETRICS & GYNECOLOGY

## 2023-12-19 RX ORDER — ALBUTEROL SULFATE 90 UG/1
2 AEROSOL, METERED RESPIRATORY (INHALATION)
COMMUNITY
Start: 2023-10-16

## 2023-12-19 NOTE — PATIENT INSTRUCTIONS
Venipuncture Blood Specimen Collection  Venipuncture performed in left arm by Lucie Riggs with good hemostasis. Patient tolerated the procedure well without complications.   12/19/23   Lucie Riggs

## 2023-12-19 NOTE — PROGRESS NOTES
"Well Woman Visit    CC: Scheduled annual well gyn visit  Chief Complaint   Patient presents with    Gynecologic Exam         HPI:   43 y.o. who presents today for annual exam. Patient states periods monthly, less than 7 days and not heavy in nature.  She is sexually active with one male partner. She denies any H/O STDS.  She denies any pain with intercourse. She denies any family H/O breast, uterine or ovarian cancer. She denies any vaginal odor, vaginal discharge, dysuria/hematuria, F/C, D/C, N/V, CP or SOB. Patient reports that she is not currently experiencing any symptoms of urinary incontinence.      History: PMHx, Meds, Allergies, PSHx, Social Hx, and POBHx all reviewed and updated.    ROS:  General ROS: negative for chills or fatigue  Ophthalmic ROS: negative for blurry vision or decreased vision  ENT ROS: negative for epistaxis, headaches or hearing change  Hematological and Lymphatic ROS: negative for bleeding problems or blood clots  Endocrine ROS: negative for breast changes or galactorrhea  Breast ROS: negative for galactorrhea or new or changing breast lumps  Respiratory ROS: negative for cough or hemoptysis  Cardiovascular ROS: negative for chest pain or dyspnea on exertion  Gastrointestinal ROS: negative for abdominal pain or appetite loss  Genito-Urinary ROS: negative for difficulty in urination or vaginal irritation   Musculoskeletal ROS: negative for gait disturbance or joint pain  Neurological ROS: negative for behavioral changes or bowel and bladder control changes  Dermatological ROS: negative for rash  Psych ROS: negative for depression      PHYSICAL EXAM:      /92   Pulse 88   Ht 162.6 cm (64\")   Wt 85.3 kg (188 lb)   LMP 2023 (Exact Date)   BMI 32.27 kg/m²  Not found.    General- NAD, alert and oriented, appropriate  Psych- Normal mood, good memory  Neck- No masses, no thyroid enlargement  CV- Regular rhythm, no murnurs  Resp- CTA to bases, no wheezes  Abdomen- Soft, " non distended, non tender, no masses    Breast Exam: Breast self awareness counseled  Breast left-  Bilaterally symmetrical, no masses, non tender, no nipple discharge  Breast right- Bilaterally symmetrical, no masses, non tender, no nipple discharge    Pelvic Exam:   External genitalia- Normal female, no lesions  Urethra/meatus- Normal, no masses, non tender  Bladder- Normal, no masses, non tender  Vagina- Normal, no atrophy, no lesions, no discharge.  Prolapse : none noted, not examined with split speculum to delineate  Cvx- Normal, no lesions, no discharge, No cervical motion tenderness-pap smear obtained   Uterus- Normal size, shape & consistency.  Non tender, mobile, & no prolapse  Adnexa- No mass, non tender    Lymphatic- No palpable neck, axillary, or groin nodes  Ext- No edema, no cyanosis    Skin- No lesions, no rashes, no acanthosis nigricans      ASSESSMENT and PLAN:    Diagnoses and all orders for this visit:    1. Well woman exam with routine gynecological exam (Primary)  -     Mammo Screening Bilateral With CAD; Future  -     Cologuard - Stool, Per Rectum; Future  -     IgP, Aptima HPV  -     Follicle Stimulating Hormone; Future        Preventative:  1. Annuals every year; Cytology collections per prevailing guidelines.   2. Mammograms begin every year at 41 yo if no abnormalities are found and no family history.  3. Bone density studies begin at 64 yo. If no fracture history or osteoporosis family history.  4. Colonoscopy begins at 46 yo. Repeat every ten years if negative and no family history.  5. Calcium of 7704-2508 mg/day in split dosing  6. Vitamin D 400-800 IU/day  7. All other preventative health recommendations will be managed by the patients Primary care physician.    She understands the importance of having any ordered tests to be performed in a timely fashion.  The risks of not performing them include, but are not limited to, advanced cancer stages, bone loss from osteoporosis and/or  subsequent increase in morbidity and/or mortality.  She is encouraged to review her results online and/or contact or office if she has questions.     Follow Up:  Return in about 1 year (around 12/19/2024) for Annual exam.    I spent 20 minutes on the separately reported service of Annual. This time is not included in the time used to support the E/M service also reported today.        Danelle Rojas, DO  12/19/2023    [facility]  [address] [place] [address]  Dept: [phone]  Dept Fax: [phone]  Loc: [phone]  Loc Fax: [phone]

## 2023-12-20 ENCOUNTER — TELEPHONE (OUTPATIENT)
Dept: OBSTETRICS AND GYNECOLOGY | Facility: CLINIC | Age: 43
End: 2023-12-20

## 2023-12-20 NOTE — TELEPHONE ENCOUNTER
----- Message from Danelle Rojas DO sent at 12/20/2023  8:16 AM EST -----  FSH does not show she is menopausal.     Electronically signed by:    Danelle Rojas DO  12/20/23  08:15 EST

## 2023-12-22 LAB
CYTOLOGIST CVX/VAG CYTO: ABNORMAL
CYTOLOGY CVX/VAG DOC CYTO: ABNORMAL
CYTOLOGY CVX/VAG DOC THIN PREP: ABNORMAL
DX ICD CODE: ABNORMAL
DX ICD CODE: ABNORMAL
HIV 1 & 2 AB SER-IMP: ABNORMAL
HPV I/H RISK 4 DNA CVX QL PROBE+SIG AMP: NEGATIVE
OTHER STN SPEC: ABNORMAL
PATHOLOGIST CVX/VAG CYTO: ABNORMAL
RECOM F/U CVX/VAG CYTO: ABNORMAL
STAT OF ADQ CVX/VAG CYTO-IMP: ABNORMAL

## 2024-01-22 ENCOUNTER — TELEPHONE (OUTPATIENT)
Dept: OBSTETRICS AND GYNECOLOGY | Facility: CLINIC | Age: 44
End: 2024-01-22
Payer: COMMERCIAL

## 2024-01-22 NOTE — TELEPHONE ENCOUNTER
----- Message from Danelle Rojas DO sent at 1/22/2024  8:46 AM EST -----  Cologuard was negative.     Electronically signed by:    Danelle Rojas DO  01/22/24  08:46 EST

## 2024-04-18 ENCOUNTER — HOSPITAL ENCOUNTER (OUTPATIENT)
Dept: MAMMOGRAPHY | Facility: HOSPITAL | Age: 44
Discharge: HOME | End: 2024-04-18
Admitting: OBSTETRICS & GYNECOLOGY
Payer: COMMERCIAL

## 2024-04-18 DIAGNOSIS — Z01.419 WELL WOMAN EXAM WITH ROUTINE GYNECOLOGICAL EXAM: ICD-10-CM

## 2024-04-18 PROCEDURE — 77067 SCR MAMMO BI INCL CAD: CPT

## 2024-04-18 PROCEDURE — 77063 BREAST TOMOSYNTHESIS BI: CPT

## 2024-05-31 RX ORDER — MONTELUKAST SODIUM 10 MG/1
10 TABLET ORAL EVERY EVENING
Qty: 90 TABLET | Refills: 1 | Status: SHIPPED | OUTPATIENT
Start: 2024-05-31

## 2024-05-31 NOTE — TELEPHONE ENCOUNTER
"  Caller: Samantha Ferro \"Sadie\"    Relationship: Self    Best call back number: [phone]     What was the call regarding: PATIENT STATES THAT SHE IS GOING OUT OF COUNTRY AND NEEDS THIS SENT TODAY.     "

## 2024-06-11 ENCOUNTER — OFFICE VISIT (OUTPATIENT)
Dept: INTERNAL MEDICINE | Facility: CLINIC | Age: 44
End: 2024-06-11
Payer: COMMERCIAL

## 2024-06-11 VITALS
WEIGHT: 188 LBS | SYSTOLIC BLOOD PRESSURE: 110 MMHG | DIASTOLIC BLOOD PRESSURE: 60 MMHG | HEIGHT: 64 IN | HEART RATE: 74 BPM | BODY MASS INDEX: 32.1 KG/M2 | OXYGEN SATURATION: 100 % | TEMPERATURE: 98.6 F

## 2024-06-11 DIAGNOSIS — R06.02 SHORTNESS OF BREATH: ICD-10-CM

## 2024-06-11 DIAGNOSIS — J18.9 COMMUNITY ACQUIRED PNEUMONIA OF LEFT LOWER LOBE OF LUNG: Primary | ICD-10-CM

## 2024-06-11 DIAGNOSIS — G93.39 OTHER POST INFECTION AND RELATED FATIGUE SYNDROMES: ICD-10-CM

## 2024-06-11 DIAGNOSIS — R61 NIGHT SWEATS: ICD-10-CM

## 2024-06-11 DIAGNOSIS — R05.1 ACUTE COUGH: ICD-10-CM

## 2024-06-11 PROCEDURE — 99214 OFFICE O/P EST MOD 30 MIN: CPT | Performed by: NURSE PRACTITIONER

## 2024-06-11 RX ORDER — PREDNISONE 20 MG/1
20 TABLET ORAL DAILY
COMMUNITY
Start: 2024-06-08 | End: 2024-06-11

## 2024-06-11 RX ORDER — CEFPODOXIME PROXETIL 200 MG/1
200 TABLET, FILM COATED ORAL EVERY 12 HOURS
Qty: 14 TABLET | Refills: 0 | Status: SHIPPED | OUTPATIENT
Start: 2024-06-11

## 2024-06-11 RX ORDER — DOXYCYCLINE HYCLATE 100 MG/1
100 CAPSULE ORAL
COMMUNITY
Start: 2024-06-08 | End: 2024-06-18

## 2024-06-11 RX ORDER — DEXTROMETHORPHAN HYDROBROMIDE AND PROMETHAZINE HYDROCHLORIDE 15; 6.25 MG/5ML; MG/5ML
5 SYRUP ORAL
COMMUNITY
Start: 2024-06-08 | End: 2024-06-11

## 2024-06-11 RX ORDER — IPRATROPIUM BROMIDE AND ALBUTEROL SULFATE 2.5; .5 MG/3ML; MG/3ML
3 SOLUTION RESPIRATORY (INHALATION)
COMMUNITY
Start: 2024-06-08

## 2024-06-12 NOTE — PROGRESS NOTES
"Chief Complaint  Follow-up (UC/Still has a lingering cough )    Subjective        Samantha Ferro presents to [facility] for follow-up from urgent care for pneumonia.  Patient had been on vacation, she got sick during her vacation, when she returned on 6/7/2024 she felt really bad.  On 6/8/2024 she went to urgent care, she was found to x-ray to have left lower lobe pneumonia.  She was prescribed doxycycline, prednisone, Promethazine DM.  Patient states some improvement since starting medication, still has several days left.  Still has significant cough.  Denies any fevers, does have some sweats at night.  Reports fatigue, shortness of breath    Objective   Vital Signs:   /60 (BP Location: Left arm, Patient Position: Sitting, Cuff Size: Adult)   Pulse 74   Temp 98.6 °F (37 °C)   Ht 162.6 cm (64\")   Wt 85.3 kg (188 lb)   SpO2 100%   BMI 32.27 kg/m²     Physical Exam  Vitals and nursing note reviewed.   Constitutional:       Appearance: Normal appearance.   HENT:      Head: Normocephalic and atraumatic.      Right Ear: External ear normal.      Left Ear: External ear normal.      Nose: Nose normal.      Mouth/Throat:      Mouth: Mucous membranes are moist.   Eyes:      Pupils: Pupils are equal, round, and reactive to light.   Cardiovascular:      Rate and Rhythm: Normal rate and regular rhythm.   Pulmonary:      Effort: Pulmonary effort is normal.      Breath sounds: Normal breath sounds.   Skin:     Capillary Refill: Capillary refill takes less than 2 seconds.   Neurological:      Mental Status: She is alert.   Psychiatric:         Mood and Affect: Mood normal.         Thought Content: Thought content normal.        Result Review :  {The following data was reviewed by CLAUS Cook on 06/12/24                Diagnoses and all orders for this visit:    1. Community acquired pneumonia of left lower lobe of lung (Primary)    2. Acute cough    3. Night sweats    4. Other " post infection and related fatigue syndromes    5. Shortness of breath    Other orders  -     cefpodoxime (VANTIN) 200 MG tablet; Take 1 tablet by mouth Every 12 (Twelve) Hours.  Dispense: 14 tablet; Refill: 0    Reviewed patient's records, reviewed x-ray, visit notes from urgent care.  Exam reassuring, patient reporting improvement, however still with significant cough, fatigue, shortness of breath, night sweats.  On review of literature, it would be beneficial to add a cephalosporin to patient's regimen, she is on doxycycline only, she has a history of allergy to penicillin, she believes only rash, this was something she was told she was allergic to as a young child.  Does not believe to be anaphylactic.  Can continue to use her medications as previously prescribed.  She does have inhaler, cough syrup, and doxycycline.  Monitor symptoms, can follow-up if needed.  Otherwise would expect significant improvement in the next 5 to 7 days.      Follow Up   No follow-ups on file.  Patient was given instructions and counseling regarding her condition or for health maintenance advice. Please see specific information pulled into the AVS if appropriate.     CLAUS Cook  6/12/2024  This note was electronically signed.

## 2024-06-13 ENCOUNTER — TELEPHONE (OUTPATIENT)
Dept: INTERNAL MEDICINE | Facility: CLINIC | Age: 44
End: 2024-06-13
Payer: COMMERCIAL

## 2024-06-13 NOTE — TELEPHONE ENCOUNTER
Spoke with patient rely message, pt states she took benadryl and the rash is gone, she wants to know if she should take the abx again and see if she has a reaction again, please advise

## 2024-06-13 NOTE — TELEPHONE ENCOUNTER
Patient called office stating she was prescribe on Tuesday Cefpodoxime and this morning she woke up with her eyes swollen and rash all over body, patient states she has been taking medication for 3 days now, she also mention that last night she took half tab of zanaflex and this morning she woke up with the symptoms, patient is wanting to know if this could be a interaction from abx and the muscle relaxer or if this is an allergic reaction to the abx, please advise

## 2024-06-13 NOTE — TELEPHONE ENCOUNTER
As long as pt does not have any swelling of lips/tongue/throat or difficulty breathing after taking the medicine, would be fine to take abx again. If rash occurs again, likely reaction and may need to change abx.

## 2024-06-13 NOTE — TELEPHONE ENCOUNTER
Could be a reaction to the antibiotic could be something else.  Would need an in person evaluation to see what the rash looks like.

## 2024-06-27 ENCOUNTER — TELEPHONE (OUTPATIENT)
Dept: INTERNAL MEDICINE | Facility: CLINIC | Age: 44
End: 2024-06-27
Payer: COMMERCIAL

## 2024-06-27 NOTE — TELEPHONE ENCOUNTER
"Caller: Samantha Ferro \"Sadie\"    Relationship: Self    Best call back number: [phone]     What is the best time to reach you: ANY    Who are you requesting to speak with (clinical staff, provider,  specific staff member): CLINICAL STAFF    What was the call regarding: PATIENT CALLED STATING THAT AFTER HER APPOINTMENT ON 6/11/24 SHE STILL HAS A PERSISTENT COUGH. SHE HAS BEEN USING HER NEBULIZER BUT IS STILL UNABLE TO BREAK UP THE CONGESTION.    PHARMACY: [id number] [place] [place] [facility] [address] AT [facility] - [phone] [facility] [phone]  [phone]   "

## 2024-08-16 ENCOUNTER — OFFICE VISIT (OUTPATIENT)
Dept: INTERNAL MEDICINE | Facility: CLINIC | Age: 44
End: 2024-08-16
Payer: COMMERCIAL

## 2024-08-16 ENCOUNTER — HOSPITAL ENCOUNTER (OUTPATIENT)
Dept: CT IMAGING | Facility: HOSPITAL | Age: 44
Discharge: HOME | End: 2024-08-16
Admitting: INTERNAL MEDICINE
Payer: COMMERCIAL

## 2024-08-16 VITALS
RESPIRATION RATE: 20 BRPM | OXYGEN SATURATION: 100 % | SYSTOLIC BLOOD PRESSURE: 108 MMHG | HEART RATE: 82 BPM | HEIGHT: 64 IN | WEIGHT: 192.2 LBS | TEMPERATURE: 99.3 F | BODY MASS INDEX: 32.81 KG/M2 | DIASTOLIC BLOOD PRESSURE: 68 MMHG

## 2024-08-16 DIAGNOSIS — M19.90 OSTEOARTHRITIS, UNSPECIFIED OSTEOARTHRITIS TYPE, UNSPECIFIED SITE: ICD-10-CM

## 2024-08-16 DIAGNOSIS — M25.50 ARTHRALGIA, UNSPECIFIED JOINT: ICD-10-CM

## 2024-08-16 DIAGNOSIS — M25.511 CHRONIC RIGHT SHOULDER PAIN: ICD-10-CM

## 2024-08-16 DIAGNOSIS — B37.9 YEAST INFECTION: ICD-10-CM

## 2024-08-16 DIAGNOSIS — Z11.59 NEED FOR HEPATITIS C SCREENING TEST: ICD-10-CM

## 2024-08-16 DIAGNOSIS — J18.9 RECURRENT PNEUMONIA: Primary | ICD-10-CM

## 2024-08-16 DIAGNOSIS — L65.9 HAIR LOSS: ICD-10-CM

## 2024-08-16 DIAGNOSIS — G89.29 CHRONIC RIGHT SHOULDER PAIN: ICD-10-CM

## 2024-08-16 DIAGNOSIS — J18.9 RECURRENT PNEUMONIA: ICD-10-CM

## 2024-08-16 PROBLEM — J30.9 ALLERGIC RHINITIS: Status: ACTIVE | Noted: 2024-08-16

## 2024-08-16 LAB
ALBUMIN SERPL-MCNC: 4.4 G/DL (ref 3.5–5.2)
ALBUMIN/GLOB SERPL: 1.5 G/DL
ALP SERPL-CCNC: 66 U/L (ref 39–117)
ALPHA1 GLOB MFR UR ELPH: 161 MG/DL (ref 90–200)
ALT SERPL W P-5'-P-CCNC: 19 U/L (ref 1–33)
ANION GAP SERPL CALCULATED.3IONS-SCNC: 9.5 MMOL/L (ref 5–15)
AST SERPL-CCNC: 26 U/L (ref 1–32)
BASOPHILS # BLD AUTO: 0.06 10*3/MM3 (ref 0–0.2)
BASOPHILS NFR BLD AUTO: 0.6 % (ref 0–1.5)
BILIRUB SERPL-MCNC: 0.3 MG/DL (ref 0–1.2)
BUN SERPL-MCNC: 16 MG/DL (ref 6–20)
BUN/CREAT SERPL: 20.5 (ref 7–25)
CALCIUM SPEC-SCNC: 9.7 MG/DL (ref 8.6–10.5)
CHLORIDE SERPL-SCNC: 103 MMOL/L (ref 98–107)
CHOLEST SERPL-MCNC: 155 MG/DL (ref 0–200)
CHROMATIN AB SERPL-ACNC: 11.1 IU/ML (ref 0–14)
CO2 SERPL-SCNC: 24.5 MMOL/L (ref 22–29)
CREAT SERPL-MCNC: 0.78 MG/DL (ref 0.57–1)
CRP SERPL-MCNC: 0.55 MG/DL (ref 0–0.5)
DEPRECATED RDW RBC AUTO: 40.1 FL (ref 37–54)
EGFRCR SERPLBLD CKD-EPI 2021: 95 ML/MIN/1.73
EOSINOPHIL # BLD AUTO: 0.13 10*3/MM3 (ref 0–0.4)
EOSINOPHIL NFR BLD AUTO: 1.3 % (ref 0.3–6.2)
ERYTHROCYTE [DISTWIDTH] IN BLOOD BY AUTOMATED COUNT: 12.7 % (ref 12.3–15.4)
ERYTHROCYTE [SEDIMENTATION RATE] IN BLOOD: 12 MM/HR (ref 0–20)
GLOBULIN UR ELPH-MCNC: 2.9 GM/DL
GLUCOSE SERPL-MCNC: 77 MG/DL (ref 65–99)
HCT VFR BLD AUTO: 41.4 % (ref 34–46.6)
HCV AB SER QL: NORMAL
HDLC SERPL-MCNC: 98 MG/DL (ref 40–60)
HGB BLD-MCNC: 13.9 G/DL (ref 12–15.9)
HIV 1+2 AB+HIV1 P24 AG SERPL QL IA: NORMAL
IGA1 MFR SER: 133 MG/DL (ref 70–400)
IGG1 SER-MCNC: 970 MG/DL (ref 700–1600)
IGM SERPL-MCNC: 196 MG/DL (ref 40–230)
IMM GRANULOCYTES # BLD AUTO: 0.03 10*3/MM3 (ref 0–0.05)
IMM GRANULOCYTES NFR BLD AUTO: 0.3 % (ref 0–0.5)
LDLC SERPL CALC-MCNC: 48 MG/DL (ref 0–100)
LDLC/HDLC SERPL: 0.5 {RATIO}
LYMPHOCYTES # BLD AUTO: 2.28 10*3/MM3 (ref 0.7–3.1)
LYMPHOCYTES NFR BLD AUTO: 23.5 % (ref 19.6–45.3)
MCH RBC QN AUTO: 29.4 PG (ref 26.6–33)
MCHC RBC AUTO-ENTMCNC: 33.6 G/DL (ref 31.5–35.7)
MCV RBC AUTO: 87.5 FL (ref 79–97)
MONOCYTES # BLD AUTO: 0.56 10*3/MM3 (ref 0.1–0.9)
MONOCYTES NFR BLD AUTO: 5.8 % (ref 5–12)
NEUTROPHILS NFR BLD AUTO: 6.65 10*3/MM3 (ref 1.7–7)
NEUTROPHILS NFR BLD AUTO: 68.5 % (ref 42.7–76)
NRBC BLD AUTO-RTO: 0 /100 WBC (ref 0–0.2)
PLATELET # BLD AUTO: 349 10*3/MM3 (ref 140–450)
PMV BLD AUTO: 9.3 FL (ref 6–12)
POTASSIUM SERPL-SCNC: 4.1 MMOL/L (ref 3.5–5.2)
PROT SERPL-MCNC: 7.3 G/DL (ref 6–8.5)
RBC # BLD AUTO: 4.73 10*6/MM3 (ref 3.77–5.28)
SODIUM SERPL-SCNC: 137 MMOL/L (ref 136–145)
T4 FREE SERPL-MCNC: 1.5 NG/DL (ref 0.92–1.68)
TRIGL SERPL-MCNC: 38 MG/DL (ref 0–150)
TSH SERPL DL<=0.05 MIU/L-ACNC: 1.56 UIU/ML (ref 0.27–4.2)
VLDLC SERPL-MCNC: 9 MG/DL (ref 5–40)
WBC NRBC COR # BLD AUTO: 9.71 10*3/MM3 (ref 3.4–10.8)

## 2024-08-16 PROCEDURE — 87070 CULTURE OTHR SPECIMN AEROBIC: CPT | Performed by: INTERNAL MEDICINE

## 2024-08-16 PROCEDURE — 80050 GENERAL HEALTH PANEL: CPT | Performed by: INTERNAL MEDICINE

## 2024-08-16 PROCEDURE — 86140 C-REACTIVE PROTEIN: CPT | Performed by: INTERNAL MEDICINE

## 2024-08-16 PROCEDURE — 71250 CT THORAX DX C-: CPT

## 2024-08-16 PROCEDURE — 83516 IMMUNOASSAY NONANTIBODY: CPT | Performed by: INTERNAL MEDICINE

## 2024-08-16 PROCEDURE — 80061 LIPID PANEL: CPT | Performed by: INTERNAL MEDICINE

## 2024-08-16 PROCEDURE — 82103 ALPHA-1-ANTITRYPSIN TOTAL: CPT | Performed by: INTERNAL MEDICINE

## 2024-08-16 PROCEDURE — 86038 ANTINUCLEAR ANTIBODIES: CPT | Performed by: INTERNAL MEDICINE

## 2024-08-16 PROCEDURE — 86037 ANCA TITER EACH ANTIBODY: CPT | Performed by: INTERNAL MEDICINE

## 2024-08-16 PROCEDURE — 85652 RBC SED RATE AUTOMATED: CPT | Performed by: INTERNAL MEDICINE

## 2024-08-16 PROCEDURE — 87205 SMEAR GRAM STAIN: CPT | Performed by: INTERNAL MEDICINE

## 2024-08-16 PROCEDURE — 86803 HEPATITIS C AB TEST: CPT | Performed by: INTERNAL MEDICINE

## 2024-08-16 PROCEDURE — 86200 CCP ANTIBODY: CPT | Performed by: INTERNAL MEDICINE

## 2024-08-16 PROCEDURE — 86431 RHEUMATOID FACTOR QUANT: CPT | Performed by: INTERNAL MEDICINE

## 2024-08-16 PROCEDURE — 84439 ASSAY OF FREE THYROXINE: CPT | Performed by: INTERNAL MEDICINE

## 2024-08-16 PROCEDURE — G0432 EIA HIV-1/HIV-2 SCREEN: HCPCS | Performed by: INTERNAL MEDICINE

## 2024-08-16 PROCEDURE — 82784 ASSAY IGA/IGD/IGG/IGM EACH: CPT | Performed by: INTERNAL MEDICINE

## 2024-08-16 RX ORDER — FLUCONAZOLE 100 MG/1
100 TABLET ORAL DAILY
Qty: 3 TABLET | Refills: 0 | Status: SHIPPED | OUTPATIENT
Start: 2024-08-16

## 2024-08-16 RX ORDER — LACTOBACILLUS ACIDOPHILUS 20B CELL
1 CAPSULE ORAL DAILY
Qty: 90 CAPSULE | Refills: 1 | Status: SHIPPED | OUTPATIENT
Start: 2024-08-16

## 2024-08-16 RX ORDER — MELOXICAM 7.5 MG/1
7.5 TABLET ORAL DAILY
Qty: 90 TABLET | Refills: 1 | Status: SHIPPED | OUTPATIENT
Start: 2024-08-16

## 2024-08-16 NOTE — PROGRESS NOTES
Chief Complaint  Pneumonia (Follow up. Still coughing off and on ) and Med Management (Concerns about inhaler )      Subjective      History of Present Illness  The patient is a 44-year-old female who presents for a follow-up today.    She began experiencing symptoms in 06/2024, initially attributing them to anxiety. During a trip to [facility], she felt extremely fatigued and unwell, with fever and loss of appetite. Upon returning, she sought urgent care and was diagnosed with pneumonia and a double ear infection. Despite initial improvement with antibiotics, her symptoms recurred after a few weeks, prompting another round of antibiotics. She also took Mucinex, which she found beneficial. Two weeks ago, she returned to urgent care due to persistent symptoms and was informed that her pneumonia was still present. She was prescribed another course of antibiotics, which improved her condition. However, she continues to cough up thick phlegm, particularly in the morning, and is concerned about her susceptibility to pneumonia. She has completed three rounds of antibiotics so far.    She reports vaginal itching, which started four days ago, but does not report any discharge. She has no history of yeast infections. She has been using an oral rinse twice daily for thrush, which has improved her condition.     She has no history of asthma.    She experienced shoulder pain a few years ago, which improved with meloxicam prescribed by her podiatrist for foot issues. She also receives annual cortisone injections in her ankles. She has a history of multiple foot fractures and uses arch supports.     She has noticed a decline in her near vision and now uses reading glasses.     She experiences body aches during rainy weather and has a history of ankle and femur fractures. She exercises 2 to 3 times a week and prefers not to take medications unless necessary.    She has a lifelong struggle with weight management. She does not feel  Xray at bedside. Confirmed successful reduction.   "hungry during the day and often skips lunch, but experiences intense hunger at night. She found phentermine helpful in the past. She also used probiotics during a previous weight loss attempt 2 to 3 years ago and is considering using them again. She does not experience frequent diarrhea.    FAMILY HISTORY  Her mother has fibromyalgia and some other issues. Her daughter was recently diagnosed with asthma.         Objective   Vital Signs:   Vitals:    08/16/24 0926   BP: 108/68   BP Location: Left arm   Patient Position: Sitting   Cuff Size: Large Adult   Pulse: 82   Resp: 20   Temp: 99.3 °F (37.4 °C)   TempSrc: Temporal   SpO2: 100%   Weight: 87.2 kg (192 lb 3.2 oz)   Height: 162.6 cm (64.02\")     Body mass index is 32.97 kg/m².    Wt Readings from Last 3 Encounters:   08/16/24 87.2 kg (192 lb 3.2 oz)   06/11/24 85.3 kg (188 lb)   12/19/23 85.3 kg (188 lb)     BP Readings from Last 3 Encounters:   08/16/24 108/68   06/11/24 110/60   12/19/23 128/92       Health Maintenance   Topic Date Due    HEPATITIS C SCREENING  Never done    TDAP/TD VACCINES (2 - Td or Tdap) 09/24/2023    ANNUAL PHYSICAL  10/21/2023    INFLUENZA VACCINE  08/01/2024    COVID-19 Vaccine (3 - 2023-24 season) 08/17/2024 (Originally 9/1/2023)    BMI FOLLOWUP  08/16/2025    MAMMOGRAM  04/18/2026    PAP SMEAR  12/19/2026    COLORECTAL CANCER SCREENING  01/11/2027    Pneumococcal Vaccine 0-64  Aged Out       Physical Exam  Vitals reviewed.   Constitutional:       Appearance: Normal appearance. She is well-developed.   HENT:      Head: Normocephalic and atraumatic.      Right Ear: External ear normal.      Left Ear: External ear normal.   Eyes:      Conjunctiva/sclera: Conjunctivae normal.      Pupils: Pupils are equal, round, and reactive to light.   Cardiovascular:      Rate and Rhythm: Normal rate and regular rhythm.      Heart sounds: No murmur heard.     No friction rub. No gallop.   Pulmonary:      Effort: Pulmonary effort is normal.      Breath " sounds: Normal breath sounds. No wheezing or rhonchi.   Skin:     General: Skin is warm and dry.   Neurological:      Mental Status: She is alert and oriented to person, place, and time.   Psychiatric:         Mood and Affect: Affect normal.         Behavior: Behavior normal.         Thought Content: Thought content normal.        Physical Exam        Result Review :  The following data was reviewed by: Alison Manuel MD on 08/16/2024:         Results  Imaging  Chest X-ray from June showed left lower pneumonia. Chest X-ray from August 4 showed a new 2 cm nodular opacity in the left upper lobe.           Procedures            Assessment & Plan  Recurrent pneumonia  Recurrent pneumonia is likely due to an initial viral infection that developed into a bacterial infection. The June x-ray showed left lower pneumonia, while the August 4 x-ray revealed a new 2 cm nodular opacity in the left upper lobe. Blood work, including thyroid function tests, immunoglobulin levels, lupus test, and HIV test, will be conducted. A chest CT scan and sputum culture are also planned to ensure appropriate bacterial treatment.   Chronic right shoulder pain    Osteoarthritis, unspecified osteoarthritis type, unspecified site    A prescription for meloxicam 7.5 mg will be provided. It should be taken with food and water, and the dose can be increased to two tablets if necessary. For severe flare-ups, daily intake is recommended until symptoms subside. For chronic osteoarthritis, aches, and pains, it should be used sparingly. Tylenol Arthritis can be used daily and then add the Mobic as needed.    Need for hepatitis C screening test    Arthralgia, unspecified joint    Hair loss    Yeast infection  Diflucan will be taken for 3 days for suspected thrush, and the oral rinse can be continued if it provides relief.        4. Weight management.  She is advised to consume small meals throughout the day to manage evening hunger. A prescription for  probiotics will be provided, to be taken as needed for diarrhea or stomach issues.    Orders Placed This Encounter   Procedures    Respiratory Culture - Sputum, Cough    CT Chest Without Contrast    Comprehensive Metabolic Panel    TSH    Lipid Panel    ANCA Panel    Sedimentation Rate    C-reactive protein    SHARRON by IFA, Reflex 9-biomarkers profile    Rheumatoid Factor    Cyclic Citrul Peptide Antibody, IgG / IgA    Hepatitis C Antibody    HIV-1/O/2 Ag/Ab w Reflex    IgG    IgM    IgA    T4, free    Alpha - 1 - Antitrypsin    CBC Auto Differential    CBC & Differential     New Medications Ordered This Visit   Medications    meloxicam (Mobic) 7.5 MG tablet     Sig: Take 1 tablet by mouth Daily.     Dispense:  90 tablet     Refill:  1    Lactobacillus (Florajen Acidophilus) capsule     Sig: Take 1 capsule by mouth Daily.     Dispense:  90 capsule     Refill:  1    fluconazole (Diflucan) 100 MG tablet     Sig: Take 1 tablet by mouth Daily.     Dispense:  3 tablet     Refill:  0          Assessment & Plan    Follow-up  A follow-up visit is scheduled in 1 month.    Patient or patient representative verbalized consent for the use of Ambient Listening during the visit with  Alison Manuel MD for chart documentation. 8/16/2024  10:27 EDT      FOLLOW UP  Return in about 1 month (around 9/16/2024).  Patient was given instructions and counseling regarding her condition or for health maintenance advice. Please see specific information pulled into the AVS if appropriate.     Alison Manuel MD  08/16/24  13:19 EDT    CURRENT & DISCONTINUED MEDICATIONS  Current Outpatient Medications   Medication Instructions    cetirizine (ZYRTEC) 5 mg, Oral, Daily    fluconazole (DIFLUCAN) 100 mg, Oral, Daily    fluticasone (FLONASE) 50 MCG/ACT nasal spray 1 spray, Nasal, Once As Needed    Lactobacillus (Florajen Acidophilus) capsule 1 capsule, Oral, Daily    meloxicam (MOBIC) 7.5 mg, Oral, Daily    montelukast (SINGULAIR) 10 mg, Oral,  Every Evening    nystatin (MYCOSTATIN) 500,000 Units, Oral, 4 Times Daily       Medications Discontinued During This Encounter   Medication Reason    albuterol sulfate  (90 Base) MCG/ACT inhaler     ipratropium-albuterol (DUO-NEB) 0.5-2.5 mg/3 ml nebulizer     cefpodoxime (VANTIN) 200 MG tablet *Therapy completed

## 2024-08-17 LAB — CCP IGA+IGG SERPL IA-ACNC: 5 UNITS (ref 0–19)

## 2024-08-18 LAB
BACTERIA SPEC RESP CULT: NORMAL
GRAM STN SPEC: NORMAL

## 2024-08-19 LAB
C-ANCA TITR SER IF: NORMAL TITER
MYELOPEROXIDASE AB SER IA-ACNC: <0.2 UNITS (ref 0–0.9)
P-ANCA ATYPICAL TITR SER IF: NORMAL TITER
P-ANCA TITR SER IF: NORMAL TITER
PROTEINASE3 AB SER IA-ACNC: <0.2 UNITS (ref 0–0.9)

## 2024-08-20 LAB
ANA SER QL IF: NEGATIVE
LABORATORY COMMENT REPORT: NORMAL

## 2024-08-21 ENCOUNTER — TELEPHONE (OUTPATIENT)
Dept: INTERNAL MEDICINE | Facility: CLINIC | Age: 44
End: 2024-08-21
Payer: COMMERCIAL

## 2024-08-21 NOTE — TELEPHONE ENCOUNTER
Spoke with patient and she states she was prescribe diflucan and was done but she still is having burning and she is wanting to know if she needs to do a UA, please advise

## 2024-08-21 NOTE — TELEPHONE ENCOUNTER
"  Caller: Samantha Ferro \"Sadie\"    Relationship: Self    Best call back number: [phone]     What is the best time to reach you: ANYTIME     Who are you requesting to speak with (clinical staff, provider,  specific staff member): CLINICAL    What was the call regarding: PATIENT REQUEST A CALL BACK TO DISCUSS HER MEDICATION. PATIENT WISHES TO SPEAK TO NURSE ABOUT DETAILS.    Is it okay if the provider responds through [employer]: NO, CALL PREFERRED MAY LEAVE VOICEMAIL.       "

## 2024-08-22 NOTE — TELEPHONE ENCOUNTER
Spoke with patient and she states it help some but yesterday she started drinking cranberry juice and she started to feel a little bit better patient went ahead and scheduled a nurse visit for UA, patient states she saw her lab results but she still coughing green sputum and she is wanting to know what she can do about this, please advise

## 2024-08-23 ENCOUNTER — CLINICAL SUPPORT (OUTPATIENT)
Dept: INTERNAL MEDICINE | Facility: CLINIC | Age: 44
End: 2024-08-23
Payer: COMMERCIAL

## 2024-08-23 DIAGNOSIS — B37.9 YEAST INFECTION: Primary | ICD-10-CM

## 2024-08-23 DIAGNOSIS — R39.9 UTI SYMPTOMS: ICD-10-CM

## 2024-08-23 LAB
BACTERIA UR QL AUTO: NORMAL
BILIRUB UR QL STRIP: NEGATIVE
CLARITY UR: CLEAR
COLOR UR: YELLOW
GLUCOSE UR STRIP-MCNC: NEGATIVE MG/DL
HGB UR QL STRIP.AUTO: ABNORMAL
KETONES UR QL STRIP: NEGATIVE
LEUKOCYTE ESTERASE UR QL STRIP.AUTO: NEGATIVE
NITRITE UR QL STRIP: NEGATIVE
PH UR STRIP.AUTO: 5.5 [PH] (ref 5–8)
PROT UR QL STRIP: NEGATIVE
RBC # UR STRIP: NORMAL /UL
REF LAB TEST METHOD: NORMAL
SP GR UR STRIP: 1.01 (ref 1–1.03)
SQUAMOUS #/AREA URNS HPF: NORMAL /[HPF]
UROBILINOGEN UR QL STRIP: ABNORMAL
WBC # UR STRIP: NORMAL /UL

## 2024-08-23 PROCEDURE — 81001 URINALYSIS AUTO W/SCOPE: CPT | Performed by: INTERNAL MEDICINE

## 2024-08-23 PROCEDURE — 81003 URINALYSIS AUTO W/O SCOPE: CPT | Performed by: INTERNAL MEDICINE

## 2024-08-23 PROCEDURE — 87086 URINE CULTURE/COLONY COUNT: CPT | Performed by: INTERNAL MEDICINE

## 2024-08-23 RX ORDER — FLUCONAZOLE 100 MG/1
100 TABLET ORAL DAILY
Qty: 3 TABLET | Refills: 0 | Status: SHIPPED | OUTPATIENT
Start: 2024-08-23

## 2024-08-23 NOTE — PROGRESS NOTES
"  UTI Protocol       1.) Samantha Ferro, 1980, presents to the clinic with  burning with urination      2.) Duration: approx. 10 days    3.) History of Frequent UTIs? no    4.) History of yeast infections with antibiotics? no    5.) Any use of OTC medications to treat UTI symptoms such as Azo? Drinking cranberry juice      6.) Any allergies to antibiotics? yes    Allergies   Allergen Reactions    Mixed Ragweed Other (See Comments)    Molds & Smuts Other (See Comments)    Penicillins Hives     Unknown. Reported from childhood      Latex Rash       7.) Antibiotic use in the last 3 months? yes    If yes what antibiotic? Cefpodoxime 200mg - 1 PO BID x 7 days (6/11/2024)        Provider Consulted: Dr. Manuel    Provider Instructions: Collected UA; send for micro and culture; treatment pending results.  Start patient on Diflucan 100mg - 1 PO Qdaily x 3 days; NR.  Prescription sent to pharmacy.    Patient c/o green colored sputum still being coughed up at times.  Dr. Manuel confirmed she could send referral in for pulmonology, however patient's labs for sputum culture indicated \"normal cristina\" and nothing to treat at this time.      1:24pm - called patient and gave her above information.  She asked that we hold on the pulmonary consult at this time.       Samantha Kumar RN  08/23/24, 10:28 EDT    "

## 2024-08-24 LAB — BACTERIA SPEC AEROBE CULT: NO GROWTH

## 2024-08-26 NOTE — TELEPHONE ENCOUNTER
At this point everything looks good but she did have extra sputum although the sputum was normal cristina.  If she is still having issues at this point I would recommend a pulmonology referral I am happy to place this for her if she would like.

## 2024-09-09 RX ORDER — MONTELUKAST SODIUM 10 MG/1
10 TABLET ORAL EVERY EVENING
Qty: 90 TABLET | Refills: 1 | Status: SHIPPED | OUTPATIENT
Start: 2024-09-09

## 2024-09-20 ENCOUNTER — OFFICE VISIT (OUTPATIENT)
Dept: INTERNAL MEDICINE | Facility: CLINIC | Age: 44
End: 2024-09-20
Payer: COMMERCIAL

## 2024-09-20 VITALS
WEIGHT: 194.8 LBS | OXYGEN SATURATION: 98 % | TEMPERATURE: 97.4 F | HEART RATE: 83 BPM | DIASTOLIC BLOOD PRESSURE: 68 MMHG | BODY MASS INDEX: 33.26 KG/M2 | SYSTOLIC BLOOD PRESSURE: 126 MMHG | HEIGHT: 64 IN

## 2024-09-20 DIAGNOSIS — R05.1 ACUTE COUGH: ICD-10-CM

## 2024-09-20 DIAGNOSIS — R09.3 INCREASED SPUTUM PRODUCTION: Primary | ICD-10-CM

## 2024-09-20 PROCEDURE — 99214 OFFICE O/P EST MOD 30 MIN: CPT | Performed by: INTERNAL MEDICINE

## 2024-09-20 RX ORDER — SEMAGLUTIDE 0.25 MG/.5ML
0.25 INJECTION, SOLUTION SUBCUTANEOUS WEEKLY
Qty: 2 ML | Refills: 1 | Status: SHIPPED | OUTPATIENT
Start: 2024-09-20

## 2024-09-20 RX ORDER — CETIRIZINE HYDROCHLORIDE, PSEUDOEPHEDRINE HYDROCHLORIDE 5; 120 MG/1; MG/1
1 TABLET, FILM COATED, EXTENDED RELEASE ORAL 2 TIMES DAILY
Qty: 60 TABLET | Refills: 1 | Status: SHIPPED | OUTPATIENT
Start: 2024-09-20

## 2024-09-20 NOTE — PROGRESS NOTES
Chief Complaint  Recurrent Pneumonia (1 month f/u); Osteoarthritis, unspecified osteoarthritis type, unspecifie (1 month f/u); Arthralgia, unspecified joint (1 month f/u); Alopecia (1 month f/u); Chronic right shoulder pain (1 month f/u); Results (Labs ); and Obesity (Patient is wanting something to help lose weight )      Subjective      History of Present Illness  The patient presents for evaluation of multiple medical concerns.    She underwent blood work during her last visit and was prescribed an antibiotic. Despite this, she continues to experience significant congestion, which she has been managing with DayQuil for the past week. Her morning routine includes a shower to alleviate the congestion. She believes the Z-Dean has improved her condition, but she still relies on DayQuil. She has always taken Mucinex but switched to DayQuil on a friend's recommendation. She has been on the same allergy medication for years and is considering a change.  She is also taking montelukast at night and is concerned about potential drowsiness. Her symptoms have improved, but she still experiences coughing, hacking, and nasal discharge. The mucus is green in color. She can go for about 3 hours without needing to blow her nose, but then suddenly needs to do so. She is concerned about the possibility of recurring illness or dormant infection. Her cough has significantly improved since her last visit, when she had to cough deeply to produce sputum. She is generally healthy but is concerned about the duration of her recovery from pneumonia.  .   She is also experiencing hair loss and is seeking recommendations for treatment.    She is interested in weight loss and is considering Wegovy or Topamax. She maintains an active lifestyle, walking 15,000 steps a day, but struggles with binge eating. She is unsure if her insurance will cover these medications or if there is a generic version available.           Objective   Vital Signs:  "  Vitals:    09/20/24 1149   BP: 126/68   BP Location: Right arm   Patient Position: Sitting   Cuff Size: Adult   Pulse: 83   Temp: 97.4 °F (36.3 °C)   TempSrc: Temporal   SpO2: 98%   Weight: 88.4 kg (194 lb 12.8 oz)   Height: 162.6 cm (64.02\")     Body mass index is 33.42 kg/m².    Wt Readings from Last 3 Encounters:   09/20/24 88.4 kg (194 lb 12.8 oz)   08/16/24 87.2 kg (192 lb 3.2 oz)   06/11/24 85.3 kg (188 lb)     BP Readings from Last 3 Encounters:   09/20/24 126/68   08/16/24 108/68   06/11/24 110/60       Health Maintenance   Topic Date Due    TDAP/TD VACCINES (2 - Td or Tdap) 09/24/2023    ANNUAL PHYSICAL  10/21/2023    INFLUENZA VACCINE  08/01/2024    COVID-19 Vaccine (3 - 2023-24 season) 09/01/2024    BMI FOLLOWUP  09/20/2025    MAMMOGRAM  04/18/2026    PAP SMEAR  12/19/2026    COLORECTAL CANCER SCREENING  01/11/2027    HEPATITIS C SCREENING  Completed    Pneumococcal Vaccine 0-64  Aged Out       Physical Exam  Vitals reviewed.   Constitutional:       Appearance: Normal appearance. She is well-developed.   HENT:      Head: Normocephalic and atraumatic.      Right Ear: External ear normal.      Left Ear: External ear normal.   Eyes:      Conjunctiva/sclera: Conjunctivae normal.      Pupils: Pupils are equal, round, and reactive to light.   Cardiovascular:      Rate and Rhythm: Normal rate and regular rhythm.      Heart sounds: No murmur heard.     No friction rub. No gallop.   Pulmonary:      Effort: Pulmonary effort is normal.      Breath sounds: Normal breath sounds. No wheezing or rhonchi.   Skin:     General: Skin is warm and dry.   Neurological:      Mental Status: She is alert and oriented to person, place, and time.   Psychiatric:         Mood and Affect: Affect normal.         Behavior: Behavior normal.         Thought Content: Thought content normal.        Physical Exam        Result Review :  The following data was reviewed by: Alison Manuel MD on 09/20/2024:         Results  Laboratory " Studies  Sputum culture showed normal bacteria. Sodium level was 137.    Imaging  CT scan showed old granulomatous disease, no worrisome enlarged lymph nodes.            Procedures            Assessment & Plan  Increased sputum production    Acute cough      Orders Placed This Encounter   Procedures    Respiratory Culture - Sputum, Cough     New Medications Ordered This Visit   Medications    cetirizine-pseudoephedrine (ZyrTEC-D) 5-120 MG per 12 hr tablet     Sig: Take 1 tablet by mouth 2 (Two) Times a Day.     Dispense:  60 tablet     Refill:  1    Semaglutide-Weight Management (Wegovy) 0.25 MG/0.5ML solution auto-injector     Sig: Inject 0.5 mL under the skin into the appropriate area as directed 1 (One) Time Per Week.     Dispense:  2 mL     Refill:  1          Assessment & Plan  1. cough.  Her recent lab results were satisfactory, with the sputum culture showing no harmful bacteria. The CT scan revealed old granulomatous disease. No concerning enlarged lymph nodes were detected. A slight increase in the inflammatory marker CRP was noted, likely due to ongoing recovery from the illness. A repeat sputum culture is recommended to ensure no bacteria are missed. She should continue with montelukast at night and take Zyrtec-D twice daily for 2 weeks to reduce mucus production. If she prefers, she can continue with DayQuil. If she develops a fever or if the mucus becomes thick and dark, a return to pulmonology is advised. If the mucus remains regular and she is breathing well without fever, an allergist consultation is recommended.    2. Telogen Effluvium.  The hair loss she is experiencing could be due to hormonal changes or the stress of recovering from pneumonia. This condition, known as telogen effluvium, typically resolves within 3 months.     3. Weight Management.  For weight loss, Wegovy, an injectable medication, could be a reasonable option, although it may not be covered by her insurance. If insurance does  not cover Wegovy, Topamax can be considered. She should avoid prolonged fasting to prevent binge eating.    Follow-up  Return in 3 months for follow up.    Patient or patient representative verbalized consent for the use of Ambient Listening during the visit with  Alison Manuel MD for chart documentation. 10/3/2024  22:11 EDT      FOLLOW UP  Return in about 3 months (around 12/20/2024).  Patient was given instructions and counseling regarding her condition or for health maintenance advice. Please see specific information pulled into the AVS if appropriate.     Alison Manuel MD  10/03/24  22:11 EDT    CURRENT & DISCONTINUED MEDICATIONS  Current Outpatient Medications   Medication Instructions    cetirizine (ZYRTEC) 5 mg, Oral, Daily    cetirizine-pseudoephedrine (ZyrTEC-D) 5-120 MG per 12 hr tablet 1 tablet, Oral, 2 Times Daily    fluticasone (FLONASE) 50 MCG/ACT nasal spray 1 spray, Nasal, Once As Needed    meloxicam (MOBIC) 7.5 mg, Oral, Daily    montelukast (SINGULAIR) 10 mg, Oral, Every Evening    topiramate (TOPAMAX) 25 mg, Oral, 2 Times Daily    Wegovy 0.25 mg, Subcutaneous, Weekly       Medications Discontinued During This Encounter   Medication Reason    fluconazole (Diflucan) 100 MG tablet     Lactobacillus (Florajen Acidophilus) capsule

## 2024-09-23 ENCOUNTER — PATIENT MESSAGE (OUTPATIENT)
Dept: INTERNAL MEDICINE | Facility: CLINIC | Age: 44
End: 2024-09-23
Payer: COMMERCIAL

## 2024-09-24 RX ORDER — TOPIRAMATE 25 MG/1
25 TABLET, FILM COATED ORAL 2 TIMES DAILY
Qty: 60 TABLET | Refills: 0 | Status: SHIPPED | OUTPATIENT
Start: 2024-09-24

## 2024-12-26 ENCOUNTER — OFFICE VISIT (OUTPATIENT)
Dept: OBSTETRICS AND GYNECOLOGY | Facility: CLINIC | Age: 44
End: 2024-12-26
Payer: COMMERCIAL

## 2024-12-26 VITALS
DIASTOLIC BLOOD PRESSURE: 83 MMHG | HEART RATE: 69 BPM | BODY MASS INDEX: 35.23 KG/M2 | SYSTOLIC BLOOD PRESSURE: 121 MMHG | WEIGHT: 205.4 LBS

## 2024-12-26 DIAGNOSIS — Z01.419 WELL WOMAN EXAM: Primary | ICD-10-CM

## 2024-12-26 NOTE — PROGRESS NOTES
Well Woman Visit    CC: Scheduled annual well gyn visit  Chief Complaint   Patient presents with    Gynecologic Exam     wwe         HPI:   44 y.o. who presents today for annual exam. Patient states periods monthly, less than 7 days and not heavy in nature.  She is sexually active with one male partner. She denies any H/O STDS.  She denies any pain with intercourse. She denies any family H/O breast, uterine or ovarian cancer. She denies any vaginal odor, vaginal discharge, dysuria/hematuria, F/C, D/C, N/V, CP or SOB. Patient reports that she is not currently experiencing any symptoms of urinary incontinence.      History: PMHx, Meds, Allergies, PSHx, Social Hx, and POBHx all reviewed and updated.    ROS:  General ROS: negative for chills or fatigue  Ophthalmic ROS: negative for blurry vision or decreased vision  ENT ROS: negative for epistaxis, headaches or hearing change  Hematological and Lymphatic ROS: negative for bleeding problems or blood clots  Endocrine ROS: negative for breast changes or galactorrhea  Breast ROS: negative for galactorrhea or new or changing breast lumps  Respiratory ROS: negative for cough or hemoptysis  Cardiovascular ROS: negative for chest pain or dyspnea on exertion  Gastrointestinal ROS: negative for abdominal pain or appetite loss  Genito-Urinary ROS: negative for difficulty in urination or vaginal irritation   Musculoskeletal ROS: negative for gait disturbance or joint pain  Neurological ROS: negative for behavioral changes or bowel and bladder control changes  Dermatological ROS: negative for rash  Psych ROS: negative for depression      PHYSICAL EXAM:      /83   Pulse 69   Wt 93.2 kg (205 lb 6.4 oz)   Breastfeeding No   BMI 35.23 kg/m²  Not found.    General- NAD, alert and oriented, appropriate  Psych- Normal mood, good memory  Neck- No masses, no thyroid enlargement  CV- Regular rhythm, no murnurs  Resp- CTA to bases, no wheezes  Abdomen- Soft, non distended, non  tender, no masses    Breast Exam: Breast self awareness counseled  Breast left-  Bilaterally symmetrical, no masses, non tender, no nipple discharge  Breast right- Bilaterally symmetrical, no masses, non tender, no nipple discharge    Pelvic Exam:   External genitalia- Normal female, no lesions  Urethra/meatus- Normal, no masses, non tender  Bladder- Normal, no masses, non tender  Vagina- Normal, no atrophy, no lesions, no discharge.  Prolapse : none noted, not examined with split speculum to delineate  Cvx- Normal, no lesions, no discharge, No cervical motion tenderness-pap smear obtained   Uterus- Normal size, shape & consistency.  Non tender, mobile, & no prolapse  Adnexa- No mass, non tender    Lymphatic- No palpable neck, axillary, or groin nodes  Ext- No edema, no cyanosis    Skin- No lesions, no rashes, no acanthosis nigricans      ASSESSMENT and PLAN:    Diagnoses and all orders for this visit:    1. Well woman exam (Primary)  -     Mammo Screening Digital Tomosynthesis Bilateral With CAD; Future        Preventative:  1. Annuals every year; Cytology collections per prevailing guidelines.   2. Mammograms begin every year at 41 yo if no abnormalities are found and no family history.  3. Bone density studies begin at 64 yo. If no fracture history or osteoporosis family history.  4. Colonoscopy begins at 46 yo. Repeat every ten years if negative and no family history.  5. Calcium of 1734-0641 mg/day in split dosing  6. Vitamin D 400-800 IU/day  7. All other preventative health recommendations will be managed by the patients Primary care physician.    She understands the importance of having any ordered tests to be performed in a timely fashion.  The risks of not performing them include, but are not limited to, advanced cancer stages, bone loss from osteoporosis and/or subsequent increase in morbidity and/or mortality.  She is encouraged to review her results online and/or contact or office if she has questions.      Follow Up:  Return in about 1 year (around 12/26/2025) for Annual exam.    I spent 20 minutes on the separately reported service of Annual. This time is not included in the time used to support the E/M service also reported today.        Daenlle Rojas,   12/26/2024    [facility]  [facility] [place] [address]  Dept: [phone]  Dept Fax: [phone]  Loc: [phone]  Loc Fax: [phone]

## 2025-01-20 RX ORDER — TOPIRAMATE 25 MG/1
25 TABLET, FILM COATED ORAL 2 TIMES DAILY
Qty: 60 TABLET | Refills: 0 | OUTPATIENT
Start: 2025-01-20

## 2025-04-24 ENCOUNTER — HOSPITAL ENCOUNTER (OUTPATIENT)
Dept: MAMMOGRAPHY | Facility: HOSPITAL | Age: 45
Discharge: HOME | End: 2025-04-24
Admitting: OBSTETRICS & GYNECOLOGY
Payer: COMMERCIAL

## 2025-04-24 DIAGNOSIS — Z01.419 WELL WOMAN EXAM: ICD-10-CM

## 2025-04-24 PROCEDURE — 77063 BREAST TOMOSYNTHESIS BI: CPT

## 2025-04-24 PROCEDURE — 77067 SCR MAMMO BI INCL CAD: CPT

## 2025-04-25 RX ORDER — MONTELUKAST SODIUM 10 MG/1
10 TABLET ORAL EVERY EVENING
Qty: 90 TABLET | Refills: 1 | Status: SHIPPED | OUTPATIENT
Start: 2025-04-25

## 2025-05-08 ENCOUNTER — TELEPHONE (OUTPATIENT)
Dept: INTERNAL MEDICINE | Facility: CLINIC | Age: 45
End: 2025-05-08
Payer: COMMERCIAL

## 2025-05-08 NOTE — TELEPHONE ENCOUNTER
Patient called office stating her job is requesting hep B vaccine, patient stated in 2020 she had a titer drawn for hep B and she is wanting to know if she should get a booster or if she should draw another titer, please advise